# Patient Record
Sex: MALE | Race: OTHER | Employment: FULL TIME | URBAN - METROPOLITAN AREA
[De-identification: names, ages, dates, MRNs, and addresses within clinical notes are randomized per-mention and may not be internally consistent; named-entity substitution may affect disease eponyms.]

---

## 2017-06-24 ENCOUNTER — OCC HEALTH (OUTPATIENT)
Dept: OCCUPATIONAL MEDICINE | Age: 62
End: 2017-06-24
Attending: PHYSICIAN ASSISTANT

## 2024-01-20 NOTE — PROGRESS NOTES
Subjective:   Moises Nettles is a 68 year old male who presents for a MA (Medicare Advantage) Supervisit (Once per calendar year) and scheduled follow up of multiple significant but stable problems.   Moises Nettles is a 68 year old male who presents for a complete physical exam.  Patient with PMH of HTN, HLD, CAD (s/p several stents in 2010 and then 2017), s/p MI (ECHO 04/20/2013 with EF of 50%), AAA s/p EVAR 2017 (on Aspirin and clopidogrel), Prediabetes, Former 23.5 pack years smoker, who presents to the office today to establish care and CPE.    Prior PCP:  Aruna Torres MD     He follows with Cardiology Dr. Irving Garcia , saw him last about 1 year ago, will be having appointment soon in February 2024.  His BP at home ar usually in the good range , around 140/80  Sometimes has elevated BP of 160/90 and has headache and not feeling well when this happens, however he states it happens rarely.    Did not take his medications today in am and his BP is elevated in the office at 146/80.    He works as  locally, works 4-5 days a week.  He is  and has 2 step children, no his own children.    Exercise: goes to aqua classes on weekend at [employer]  Diet: Regular diet, tries to avoid fried foods, red meats, pork. Avoids carbs.  Eye exam: once a year, reading glasses only  Dentist: 2 times a year goes for cleanings , has implants [facility]  Smoking: started in 1972 (was 15 yo), quit smoking in 2019, smoked 10 -20 cigarettes a day. Has at least 23.5 pack years history.  Alcohol: Occaisional  No other substance use    Vaccinations:  Influenza: Does not want   COVID:  x 3, no boosters  Pneumococcal:  doesn't want to   Shingles: wants to think about it  Tdap/Td: doesn't want to     Screenings:  Colonoscopy:  Colonoscopy by Dr. Zepeda at [facility] in [place] 2013? Was normal per patient. DUE  PSA: elevated at 6.56 in 4/22/23 - Follows with Urology, has appointment soon  AAA: AAA s/p EVAR 2017, follows  with vascular  Low dose CT: DUE, has >23.5 pack years history    History/Other:   Fall Risk Assessment:   He has been screened for Falls and is low risk.      Cognitive Assessment:   He had a completely normal cognitive assessment - see flowsheet entries       Functional Ability/Status:   Moises Nettles has some abnormal functions as listed below:  He has difficulties Affording Meds based on screening of functional status.       Depression Screening (PHQ-2/PHQ-9): PHQ-2 SCORE: 0  , done 1/23/2024   If you checked off any problems, how difficult have these problems made it for you to do your work, take care of things at home, or get along with other people?: Not difficult at all    Last [place] Suicide Screening on 1/20/2024 was No Risk.     <5 minutes spent screening and counseling for depression    Advanced Directives:   He does NOT have a Living Will. [Do you have a living will?: No]  He does NOT have a Power of  for Health Care. [Do you have a healthcare power of ?: No]  Not discussed      Patient Active Problem List   Diagnosis    Patellofemoral disorder    Coronary atherosclerosis due to calcified coronary lesion    Primary hypertension    Mixed hyperlipidemia     Allergies:  He has No Known Allergies.    Current Medications:  Outpatient Medications Marked as Taking for the 1/20/24 encounter (Office Visit) with Lois Cali MD   Medication Sig    atorvastatin 20 MG Oral Tab Take 1 tablet (20 mg total) by mouth daily.    chlorthalidone 25 MG Oral Tab Take 1 tablet (25 mg total) by mouth 2 (two) times daily.    lisinopril 20 MG Oral Tab Take 1 tablet (20 mg total) by mouth daily.    [DISCONTINUED] potassium chloride 20 MEQ Oral Tab CR Take 1 tablet (20 mEq total) by mouth as needed.    OMEGA-3 FATTY ACIDS OR Take by mouth As Directed.    [DISCONTINUED] Magnesium 200 MG Oral Tab Take 1 tablet (200 mg total) by mouth twice a week.    cloNIDine 0.3 MG/24HR Transdermal Patch Weekly Place 1 patch onto  the skin once a week.    Carvedilol 6.25 MG Oral Tab Take 1 tablet (6.25 mg total) by mouth 2 (two) times daily with meals.    Clopidogrel Bisulfate 75 MG Oral Tab Take 1 tablet (75 mg total) by mouth daily.    Aspirin 81 MG Oral Tab Take 1 tablet (81 mg total) by mouth daily.    Multiple Vitamins-Minerals (CENTRUM OR) Take  by mouth.    CloNIDine HCl 0.3 MG/24HR Transdermal Patch Weekly Place 1 patch onto the skin once a week.    FISH OIL 1200 MG OR CAPS 1 tablet daily       Medical History:  He  has a past medical history of Essential hypertension.  Surgical History:  He  has a past surgical history that includes coronary stent placement (Right, 2010); angioplasty (coronary) (N/A, 2017); and endovascular repair, infrarenal abdominal aortic aneury (2017).   Family History:  His family history includes Cancer in his father; Hypertension in his mother.  Social History:  He  reports that he has quit smoking. He has never been exposed to tobacco smoke. He does not have any smokeless tobacco history on file. He reports that he does not drink alcohol and does not use drugs.    Tobacco:  He smoked tobacco in the past but quit greater than 12 months ago.  Social History    Tobacco Use      Smoking status: Former        Passive exposure: Never      Smokeless tobacco: Not on file       CAGE Alcohol Screen:   CAGE screening score of 0 on 1/23/2024, showing low risk of alcohol abuse.      Patient Care Team:  Lois Cali MD as PCP - General (Internal Medicine)    REVIEW OF SYSTEMS:  GENERAL: feels well otherwise  SKIN: denies any unusual skin lesions  EYES:denies blurred vision or double vision  HEENT: denies nasal congestion, sinus pain or ST  LUNGS: denies shortness of breath with exertion  CARDIOVASCULAR: denies chest pain on exertion  GI: denies abdominal pain,positive for intermittent heartburn  : denies dysuria, frequent urination , wakes at night  MUSCULOSKELETAL: denies back pain  NEURO: denies headaches  PSYCHE:  denies depression or anxiety  HEMATOLOGIC: denies hx of anemia  ENDOCRINE: denies thyroid history  ALL/ASTHMA: denies hx of allergy or asthma    Objective:     PHYSICAL EXAM:   /80 (BP Location: Left arm, Patient Position: Sitting, Cuff Size: adult)   Pulse 68   Temp 97.8 °F (36.6 °C)   Resp 20   Ht 6' (1.829 m)   Wt 222 lb (100.7 kg)   SpO2 99%   BMI 30.11 kg/m²  Estimated body mass index is 30.11 kg/m² as calculated from the following:    Height as of this encounter: 6' (1.829 m).    Weight as of this encounter: 222 lb (100.7 kg).    GENERAL: well developed, well nourished,in no apparent distress  SKIN: no rashes,no suspicious lesions  HEENT: atraumatic, normocephalic,ears and throat are clear, dry nasal mucosa  EYES:PERRLA, conjunctiva are clear  NECK: supple,no adenopathy  LUNGS: clear to auscultation  CARDIO: nl s1 and s2, RRR without murmur  GI: good BS's,no masses, HSM or tenderness  MUSCULOSKELETAL: back is not tender,FROM of the back  EXTREMITIES: no cyanosis, clubbing or edema  NEURO: Oriented times three,cranial nerves are intact,motor and sensory are grossly intact    Medicare Hearing Assessment: Patient wears hearing aids    Assessment & Plan:   Moises Nettles is a 68 year old male who presents for a Medicare Assessment.     1. Medicare Annual wellness visit, subsequent  Pt' s weight is Body mass index is 30.11 kg/m². Recommended regular exercise. Diet discussed.  Age appropriate screenings discussed and orders placed.  The patient indicates understanding of these issues and agrees to the plan.  - Comp Metabolic Panel (14); Future  - Hemoglobin A1C; Future    2. Primary hypertension  Elevated in the office today at 146/80. Patient did not take his morning medications. Compliant with medications, reports no side effects and his BP at home around 140/80.     3. Mixed hyperlipidemia  Patient has h/o HLD, currently on atorvastatin 20 mg daily and fish oil 1200 mg. Recent Lipid panel Showed LDL at  73. His goal ideally is <70    4. Colon cancer screening  Patient overdue for Colon cancer screening colonoscopy. Will refer to GI    5. Coronary atherosclerosis due to calcified coronary lesion  Patient follows with Cardiology and has an appointment with him in February    6. Screening for thyroid disorder  - TSH W Reflex To Free T4; Future    7. Screening for colon cancer  - Gastro Referral - In Network    8. Hypomagnesemia  Has h/o of low Mag in the past. Takes Magnesium 200 mg 2 times a week.  - Magnesium [E]; Future    10. Screening for lung cancer  11. Former smoker  Started smoking in 1972 (was 17 yo), quit smoking in 2019, smoked 10 -20 cigarettes a day. Has at least 23.5 pack years history.  - CT LUNG LD SCREENING(CPT=71271); Future         The patient indicates understanding of these issues and agrees to the plan.  Reinforced healthy diet, lifestyle, and exercise.      Return in about 6 months (around 7/20/2024) for medication checkup.     Lois Cali MD, 1/23/2024     Supplementary Documentation:   General Health:  In the past six months, have you lost more than 10 pounds without trying?: 2 - No  Has your appetite been poor?: No  Type of Diet: Balanced  How does the patient maintain a good energy level?: Appropriate Exercise;Stretching  How would you describe your daily physical activity?: Moderate  How would you describe your current health state?: Good  How do you maintain positive mental well-being?: Social Interaction;Visiting Friends;Visiting Family  On a scale of 0 to 10, with 0 being no pain and 10 being severe pain, what is your pain level?: 0 - (None)  In the past six months, have you experienced urine leakage?: 0-No  At any time do you feel concerned for the safety/well-being of yourself and/or your children, in your home or elsewhere?: No  Have you had any immunizations at another office such as Influenza, Hepatitis B, Tetanus, or Pneumococcal?: No        Moises Nettles's SCREENING SCHEDULE    Tests on this list are recommended by your physician but may not be covered, or covered at this frequency, by your insurer.   Please check with your insurance carrier before scheduling to verify coverage.   PREVENTATIVE SERVICES FREQUENCY &  COVERAGE DETAILS LAST COMPLETION DATE   Diabetes Screening    Fasting Blood Sugar / Glucose    One screening every 12 months if never tested or if previously tested but not diagnosed with pre-diabetes   One screening every 6 months if diagnosed with pre-diabetes Lab Results   Component Value Date    GLUCOSE 118 (H) 10/22/2013     (H) 01/20/2024        Cardiovascular Disease Screening    Lipid Panel  Cholesterol  Lipoprotein (HDL)  Triglycerides Covered every 5 years for all Medicare beneficiaries without apparent signs or symptoms of cardiovascular disease Lab Results   Component Value Date    CHOLEST 163 10/22/2013    HDL 36 (L) 10/22/2013    LDL 89 10/22/2013         Electrocardiogram (EKG)   Covered if needed at Welcome to Medicare, and non-screening if indicated for medical reasons 03/18/2013      Ultrasound Screening for Abdominal Aortic Aneurysm (AAA) Covered once in a lifetime for one of the following risk factors    Men who are 65-75 years old and have ever smoked    Anyone with a family history -     Colorectal Cancer Screening  Covered for ages 50-85; only need ONE of the following:    Colonoscopy   Covered every 10 years    Covered every 2 years if patient is at high risk or previous colonoscopy was abnormal -    Health Maintenance   Topic Date Due    Colorectal Cancer Screening  Never done       Flexible Sigmoidoscopy   Covered every 4 years -    Fecal Occult Blood Test Covered annually -   Prostate Cancer Screening    Prostate-Specific Antigen (PSA) Annually No results found for: \"PSA\"  Health Maintenance   Topic Date Due    PSA  04/22/2025      Immunizations    Influenza Covered once per flu season  Please get every year -  Influenza Vaccine(1) due on  10/01/2023    Pneumococcal Each vaccine ([id number] & [id number]) covered once after 65 Prevnar 13: -    [id number]: -     Pneumococcal Vaccination(1 of 2 - PCV) Never done    Hepatitis B One screening covered for patients with certain risk factors   -  No recommendations at this time    Tetanus Toxoid Not covered by Medicare Part B unless medically necessary (cut with metal); may be covered with your pharmacy prescription benefits -    Tetanus, Diptheria and Pertusis TD and TDaP Not covered by Medicare Part B -  No recommendations at this time    Zoster Not covered by Medicare Part B; may be covered with your pharmacy  prescription benefits -  Zoster Vaccines(1 of 2) Never done     Annual Monitoring of Persistent Medications (ACE/ARB, digoxin diuretics, anticonvulsants)    Potassium Annually Lab Results   Component Value Date    K 3.3 (L) 01/20/2024         Creatinine   Annually Lab Results   Component Value Date    CREATSERUM 0.94 01/20/2024         BUN Annually Lab Results   Component Value Date    BUN 14 01/20/2024       Drug Serum Conc Annually No results found for: \"DIGOXIN\", \"DIG\", \"VALP\"

## 2024-01-22 NOTE — TELEPHONE ENCOUNTER
Spoke with the patient's Wife Maria D over the phone.     1. Elevated hemoglobin A1c  His HBA1C in the diabetic range. Explained that this is not diagnostic of Diabetes yet. Recommend intense life style changes at this time and would repeat Hemoglobin A1 c in 3 months.   - Hemoglobin A1C [E]; Future    2. Low serum potassium level  In regards to low potassium - would increase his potassium supplement from 20 meq daily to 30 meq daily. Running low on supply. Will sent new prescription. Will need to recheck K in 1 months.  - potassium chloride 15 MEQ Oral Tab CR; Take 2 tablets (30 mEq total) by mouth daily.  Dispense: 60 tablet; Refill: 1  - Potassium [E]; Future    3. Hypomagnesemia  Patient takes Magnesium supplementation 2 times a week. Would increase to three times a week. Will repeat Mag in 1 month  - Magnesium [E]; Future

## 2024-01-22 NOTE — TELEPHONE ENCOUNTER
Patient's wife called to follow up on test results, specifically A1C.  She is aware Dr Cali has not reviewed the results yet.  She is available today until 10:30.  Please advise.  Thank you!

## 2024-01-25 NOTE — TELEPHONE ENCOUNTER
From: Moises Nettles  To: Lois Cali  Sent: 1/24/2024 12:52 PM CST  Subject: referral for nutritionist     Hi Dr. SONALI Cali  I have hard time with my diet as so much different info all over  I really want to do it and I called yesterday to my insurance asking if I can get cover to see dietologist or nutritionist and they respond Yes  I call today for an appointment and nutritionist ask for referral. Is it any possibility to get a referral from you  Thank you,  ORESTES Nettles

## 2024-02-03 NOTE — PROGRESS NOTES
ADULT INITIAL OUTPATIENT NUTRITION CONSULTATION    Nutrition Assessment    Medical Diagnosis: Pre-DM    PMH: hyperlipidemia, obesity    Client Hx:  68 year old male    Meds: noted    Labs (1/20/23): A1C: 7.1%    ANTHROPOMETRICS:  Ht: 6'  Wt: 222#    BMI: 30.1  AdjBW: 191#    Current Diet: Regular    Diet hx: pt has been avoiding sweets and alcohol    Food/Beverage Intake:   BREAKFAST: 2 slices whole grain toast, hummus, 1 egg, banana OR turkey simone, bagel, eggs, banana  LUNCH: (brings to work from home) buckwheat, chicken, broccoli OR salad, avocado, chicken, or bologna sausage  DINNER: beet broth soup, buckwheat, OR 2c chili, buckwheat, quinoa, veggies, OR salmon  SNACKS: PB and celery, blackberries, green tea, walnuts, apple, yogurt  BEVERAGES: coffee 1-2 c/dy, water, no juice or soda    Meal pattern: 3 meals/d, 2 snacks/d    Number of meals eaten at restaurants: 1x/month    Alcohol Intake: none, was having ~5 shots/wk    Diet Quality: Improved    Nutrient intake (based on diet record): ~2000 cals/d    Estimated nutrition needs: 4013-5850 cals/d, 110 gm protein, 160gm cho    Physical Activity: drives truck all day, 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   GOAL: 150+ min/wk, cardio    Sleep: 6 hrs/d    Stress/anxiety: high (coping mechanisms encouraged)    Client attended appt with: supportive spouse, Maria D    Nutrition Diagnosis: Food and nutrition related knowledge deficit related to lack of previous diet education by RD as evidence by pt need for education regarding weight loss management and pre-diabetes management.    Nutrition Intervention/Education: Comprehensive nutrition education and evaluation provided for weight loss management and pre-diabetes management. Pt and spouse hoping to avoid meds for DM. Pt reports making many dietary changes over the past few weeks and has already lost 6#. Pt does enjoy cheese and sausage and alcohol, however, has been avoiding all of these. Pt's  spouse prepares all meals, limiting sweets and adding lots of veggies daily. Pt was having fruit for snacks, now adding nuts and PB. Discussed portion control of all foods, reviewed label reading. Encouraged lean protein, low fat dairy, whole grains, omega 3 rich foods, and veggies. Pt aware to increase activity. Written materials were issued and explained, all questions answered today. Pt has set goals to follow recommendations set today, and to contact RD with further questions or concerns.     Monitoring/Evaluation:  Pt to follow with MD for labs. RD available prn.     Time spent with patient and spouse: 75 minutes    Thank you for the referral,    Carlita Baumann RD, LDN  [email or web address]  P: [phone]

## 2024-02-12 NOTE — TELEPHONE ENCOUNTER
Is this medication prescribed by the [facility] Providers? yes    Did the patient contact the pharmacy directly?:  yes - Dr Cali has not filled meds yet.    Is patient out of meds or supply very low?:  2 weeks left    Medication Requested:  potassium chloride 15 MEQ Oral Tab CR   Clopidogrel Bisulfate 75 MG Oral Tab     Is patient requesting a 30 or 90 day supply?:  90    [facility] and phone [facility] or [facility]  [facility] [place] - [address] to [facility] Sites, [phone], [phone]     Is the patient due for an appointment?: no - due in July for med check (if so, please schedule appt)    Additional Notes:  none    Please advise the patient refills take up to 72 business hours.

## 2024-02-12 NOTE — TELEPHONE ENCOUNTER
Pt has apt with new cardiologist March 13th, are you able to prescribe plavix until he sees cardiology?     Last OV relevant to medication: 1/20/24  Last refill date: historical  When pt was asked to return for OV: 7/20/24  Upcoming appt/reason: No future appointments.  Was pt informed of any over due labs: message sent  Lab Results   Component Value Date     (H) 01/20/2024    BUN 14 01/20/2024    CREATSERUM 0.94 01/20/2024    ANIONGAP 6 01/20/2024    GFRNAA > 90 04/13/2013    GFRAA > 90 04/13/2013    CA 8.8 01/20/2024    OSMOCALC 299 (H) 01/20/2024    ALKPHO 44 (L) 01/20/2024    AST 23 01/20/2024    ALT  01/20/2024      Comment:      Due to  backorder we are temporarily unable to offer hospital-based ALT testing at [facility].   If urgently needed, please order ALT test code [id number].   The new order will need a new venipuncture and will be sent to [facility] for testing.   The expected turnaround time will be within 24 hours.     BILT 0.8 01/20/2024    TP 7.3 01/20/2024    ALB 3.8 01/20/2024    GLOBULIN 3.5 01/20/2024    AGRATIO 1.6 10/22/2013     01/20/2024    K 3.3 (L) 01/20/2024     01/20/2024    CO2 33.0 (H) 01/20/2024

## 2024-02-17 NOTE — DISCHARGE INSTRUCTIONS
You may take a warm shower but do not sit in a hot tub or use a heating pad.  Use ice where it hurts, 20 minutes on and 20 minutes off.    Primary care follow-up this coming week.    The baclofen muscle relaxant may make you drowsy.  Do not take it if you are working or driving.

## 2024-02-17 NOTE — ED PROVIDER NOTES
Patient Seen in: [facility]      History     Chief Complaint   Patient presents with    Back Pain     Stated Complaint: back pain    Subjective:   HPI    68-year-old male presents to the emergency department stating that he developed mid back pain nonradiating that began 2 days ago at work.  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  Symptoms are worse with movement.  He denies any cough or difficulty breathing, vomiting or diarrhea.  No abdominal pain.  No analgesic use.    Objective:   No pertinent past medical history.            Past Surgical History:   Procedure Laterality Date    ANGIOPLASTY (CORONARY) N/A 2017    complex PCI    CORONARY STENT PLACEMENT Right 2010    ENDOVASCULAR REPAIR, INFRARENAL ABDOMINAL AORTIC ANEURY  2017                Social History     Socioeconomic History    Marital status:    Tobacco Use    Smoking status: Former     Passive exposure: Never   Vaping Use    Vaping Use: Never used   Substance and Sexual Activity    Alcohol use: Never    Drug use: Never              Review of Systems    Positive for stated complaint: back pain  Other systems are as noted in HPI.  Constitutional and vital signs reviewed.      All other systems reviewed and negative except as noted above.    Physical Exam     ED Triage Vitals [02/17/24 1351]   /66   Pulse 58   Resp 18   Temp 98.4 °F (36.9 °C)   Temp src Temporal   SpO2 97 %   O2 Device None (Room air)       Current:/66   Pulse 58   Temp 98.4 °F (36.9 °C) (Temporal)   Resp 18   Wt 100 kg   SpO2 97%   BMI 29.90 kg/m²         Physical Exam    General appearance: This is an older male sitting on a gurney in no apparent distress.  Vital signs were reviewed per nurses notes.  HEENT: Normocephalic atraumatic.  Anicteric sclera.  Oral mucosa is moist.  Neck nontender without adenopathy or thyromegaly.  Lungs are clear to auscultation.  Heart exam: Normal S1-S2 without extra sounds or murmurs.  Regular  rate and rhythm.  Chest is nontender.  Abdomen is nontender without masses or rebound.  Back: Patient localizes the pain to the mid [place] thoracic region midline.  No crepitations or step-offs.  No discrete tenderness.  Skin: No rashes or lesions.  Extremities are atraumatic.  Neuroexam: Awake and moving all 4 extremities well.    ED Course   Labs Reviewed - No data to display          XR THORACIC SPINE (3 VIEWS) (CPT=72072)    Result Date: 2/17/2024  PROCEDURE:  XR THORACIC SPINE (3 VIEWS) (CPT=72072)  TECHNIQUE:  AP, lateral, and swimmer's views of the thoracic spine were obtained.  COMPARISON:  None.  INDICATIONS:  back pain  PATIENT STATED HISTORY: (As transcribed by Technologist)  Upper back pain. No trauma.    FINDINGS:    BONES:  There is mild kyphosis.  No significant spondylosis, scoliosis, fracture, or visible bony lesion. DISC SPACES:  There is mild disc space narrowing with osteophytes consistent mild degenerative disc disease throughout the mid thoracic spine. PARASPINOUS:  Negative.  No paraspinous abnormality is seen. OTHER:  Stent graft overlies the lower thoracic/upper lumbar spine..             CONCLUSION:  Mild degenerative disc disease within the midthoracic spine without acute bony injury.   LOCATION:  [facility]    Dictated by (CST): Dhaval Mckeon MD on 2/17/2024 at 2:01 PM     Finalized by (CST): Dhaval Mckeon MD on 2/17/2024 at 2:03 PM       I personally reviewed the images myself and went over results with patient.    I viewed the thoracic spine series films myself and there are no acute fractures.  Some DJD.    Test results and treatment plan were discussed with the patient and his wife.  Discharge instructions were rendered.         MDM      #1.  Acute midline thoracic back pain.  Likely musculoskeletal in etiology.  No evidence of compression fracture.  No radicular symptoms to suggest nerve root impingement.  Discharged home with RICE, topical agents and PCP follow-up.                                    Medical Decision Making      Disposition and Plan     Clinical Impression:  1. Acute midline thoracic back pain         Disposition:  Discharge  2/17/2024  2:10 pm    Follow-up:  Lois Cali MD  [address] [id number]  [phone]    Call in 2 days            Medications Prescribed:  Discharge Medication List as of 2/17/2024  2:13 PM        START taking these medications    Details   ibuprofen 600 MG Oral Tab Take 1 tablet (600 mg total) by mouth every 8 (eight) hours as needed for Pain or Fever., Normal, Disp-30 tablet, R-0      baclofen 20 MG Oral Tab Take 1 tablet (20 mg total) by mouth 3 (three) times daily as needed., Normal, Disp-24 tablet, R-0      lidocaine-menthol 4-1 % External Patch Place 1 patch onto the skin daily., Normal, Disp-30 patch, R-0

## 2024-02-21 NOTE — PROGRESS NOTES
OFFICE NOTE     Patient ID: Moises Nettles is a 68 year old male.  Today's Date: 02/21/24  Chief Complaint: Urgent Care F/u (Follow up-back pain nonradiating that began 2 days ago at work.  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  Symptoms are worse with movement- xrays done )    Patient comes in today for the follow-up after urgent care visit for acute back pain in the mid back.  He states that on Tuesday-Wednesday has been moving to a few things around and was feeling okay, however on Thursday while in the truck he started noticing some back discomfort, which gradually got worse on Friday.  On Saturday patient decided to go to the urgent care for evaluation because he heard few cracks in his back, while trying to do stretching exercises at home.  Spine x-ray was performed in the urgent care and did not show any acute abnormality.,  However showed mild degenerative disc disease.  Patient was prescribed muscle relaxer and ibuprofen and lidocaine patch.  He has been taking ibuprofen consistently with improvement of his pain, however baclofen made him feel good and made them very sleepy, so he stopped taking it.      Vitals:    02/21/24 1500   BP: 124/70   Pulse: 76   Resp: 18   Temp: 97.8 °F (36.6 °C)   SpO2: 99%   Weight: 217 lb (98.4 kg)   Height: 6' (1.829 m)     body mass index is 29.43 kg/m².  BP Readings from Last 3 Encounters:   02/21/24 124/70   02/17/24 113/66   01/20/24 146/80     The ASCVD Risk score (Faraz DK, et al., 2019) failed to calculate for the following reasons:    Cannot find a previous HDL lab    Cannot find a previous total cholesterol lab      Medications reviewed:  Current Outpatient Medications   Medication Sig Dispense Refill    ibuprofen 600 MG Oral Tab Take 1 tablet (600 mg total) by mouth every 8 (eight) hours as needed for Pain or Fever. 30 tablet 0    baclofen 20 MG Oral Tab Take 1 tablet (20 mg total) by mouth 3 (three) times daily as needed. 24 tablet 0     lidocaine-menthol 4-1 % External Patch Place 1 patch onto the skin daily. 30 patch 0    Potassium Chloride Monisha ER 15 MEQ Oral Tab CR Take 2 tablets (30 mEq total) by mouth daily. 180 tablet 0    clopidogrel 75 MG Oral Tab Take 1 tablet (75 mg total) by mouth daily. 90 tablet 0    Magnesium 200 MG Oral Tab Take 1 tablet (200 mg total) by mouth 3 (three) times a week at 1600.      atorvastatin 20 MG Oral Tab Take 1 tablet (20 mg total) by mouth daily.      chlorthalidone 25 MG Oral Tab Take 1 tablet (25 mg total) by mouth 2 (two) times daily.      lisinopril 20 MG Oral Tab Take 1 tablet (20 mg total) by mouth daily.      OMEGA-3 FATTY ACIDS OR Take by mouth As Directed.      cloNIDine 0.3 MG/24HR Transdermal Patch Weekly Place 1 patch onto the skin once a week.      Carvedilol 6.25 MG Oral Tab Take 1 tablet (6.25 mg total) by mouth 2 (two) times daily with meals.      Aspirin 81 MG Oral Tab Take 1 tablet (81 mg total) by mouth daily.      Multiple Vitamins-Minerals (CENTRUM OR) Take  by mouth.      CloNIDine HCl 0.3 MG/24HR Transdermal Patch Weekly Place 1 patch onto the skin once a week.      FISH OIL 1200 MG OR CAPS 1 tablet daily           Assessment & Plan    1. Mid back pain (Primary)  Patient's back pain most likely related to muscle spasm.  Back x-ray was without significant findings.  Will order physical therapy for the patient.  -     Physical Therapy Referral - [facility]  -    Patient instructed to continue the therapy prescribed in urgent care.        Follow Up: (around 7/20/2024) for medication checkup.          Objective/ Results:   Physical Exam  Constitutional:       Appearance: Normal appearance. He is normal weight.   Cardiovascular:      Rate and Rhythm: Normal rate and regular rhythm.      Heart sounds: Normal heart sounds. No murmur heard.     No friction rub. No gallop.   Pulmonary:      Effort: Pulmonary effort is normal. No respiratory distress.      Breath sounds: Normal breath  sounds. No stridor. No wheezing, rhonchi or rales.   Chest:      Chest wall: No tenderness.   Musculoskeletal:      Thoracic back: Spasms and tenderness present. No swelling, edema, deformity, signs of trauma, lacerations or bony tenderness. Normal range of motion. No scoliosis.   Neurological:      General: No focal deficit present.      Mental Status: He is alert and oriented to person, place, and time. Mental status is at baseline.      Cranial Nerves: No cranial nerve deficit.      Sensory: No sensory deficit.      Motor: No weakness.      Coordination: Coordination normal.      Gait: Gait normal.   Psychiatric:         Mood and Affect: Mood normal.         Behavior: Behavior normal.         Thought Content: Thought content normal.        Reviewed:    Patient Active Problem List    Diagnosis    Coronary atherosclerosis due to calcified coronary lesion    Primary hypertension    Mixed hyperlipidemia    Patellofemoral disorder      No Known Allergies     Social History     Socioeconomic History    Marital status:    Tobacco Use    Smoking status: Former     Passive exposure: Never   Vaping Use    Vaping Use: Never used   Substance and Sexual Activity    Alcohol use: Never    Drug use: Never      Review of Systems  All other systems negative unless otherwise stated in ROS or HPI above.       Lois Cali MD  Internal Medicine       Call office with any questions or seek emergency care if necessary.   Patient understands and agrees to follow directions and advice.      ----------------------------------------- PATIENT INSTRUCTIONS-----------------------------------------     There are no Patient Instructions on file for this visit.

## 2024-03-25 NOTE — TELEPHONE ENCOUNTER
Is this medication prescribed by the [facility] Providers? No    Did the patient contact the pharmacy directly?:  Yes    Is patient out of meds or supply very low?:  No. 8    Medication Requested:  chlorthalidone     Dose:  25 MG Oral Tab     Is patient requesting a 30 or 90 day supply?:  90    Pharmacy name and phone # or location:  [place] [address] [place] [address]. [phone], [phone]     Is the patient due for an appointment?: No  (if so, please schedule appt)    Additional Notes:  Not prescribed by Dr Cali, but pt insists they spoke about it    Please advise the patient refills take up to 72 business hours    ======================================    Is this medication prescribed by the [facility] Providers? No    Did the patient contact the pharmacy directly?:  Yes    Is patient out of meds or supply very low?:  No, 8    Medication Requested:  Carvedilol     Dose:  6.25 MG Oral Tab     Is patient requesting a 30 or 90 day supply?:  90    Pharmacy name and phone # or location:  [place] [address] [place] [address]. [phone], [phone]     Is the patient due for an appointment?: No  (if so, please schedule appt)    Additional Notes:  Not prescribed by Dr Cali, but pt insists they spoke about it    Please advise the patient refills take up to 72 business hours..

## 2024-03-25 NOTE — TELEPHONE ENCOUNTER
Pt states that these are managed by you and not cardiology. Looks like he saw cards 3/814/24. Please advise, thank you.

## 2024-05-21 NOTE — TELEPHONE ENCOUNTER
Patient requested external medication, not prescribed by our office. Called patient. Spoke to spouse. Previously prescribed by prior pcp. Has 4 days left. Confirmed dose.    Last OV relevant to medication: 1/20, visit since   Last refill date: unknown     #/refills: unknown   When pt was asked to return for OV:   Follow Up: (around 7/20/2024) for medication checkup.     Upcoming appt/reason:   Future Appointments   Date Time Provider Department Center   6/12/2024 10:00 AM Yan Manzano MD [facility] SUB GI       Was pt informed of any over due labs: no active labs    . Primary hypertension  Elevated in the office today at 146/80. Patient did not take his morning medications. Compliant with medications, reports no side effects and his BP at home around 140/80.         Lab Results   Component Value Date     (H) 01/20/2024    BUN 14 01/20/2024    CREATSERUM 0.94 01/20/2024    ANIONGAP 6 01/20/2024    GFRNAA > 90 04/13/2013    GFRAA > 90 04/13/2013    CA 8.8 01/20/2024    OSMOCALC 299 (H) 01/20/2024    ALKPHO 44 (L) 01/20/2024    AST 23 01/20/2024    ALT  01/20/2024      Comment:      Due to  backorder we are temporarily unable to offer hospital-based ALT testing at [facility].   If urgently needed, please order ALT test code [id number].   The new order will need a new venipuncture and will be sent to [facility] for testing.   The expected turnaround time will be within 24 hours.     BILT 0.8 01/20/2024    TP 7.3 01/20/2024    ALB 3.8 01/20/2024    GLOBULIN 3.5 01/20/2024    AGRATIO 1.6 10/22/2013     01/20/2024    K 3.5 02/24/2024     01/20/2024    CO2 33.0 (H) 01/20/2024

## 2024-06-04 NOTE — TELEPHONE ENCOUNTER
Is this medication prescribed by the [facility] Providers? Yes     Did the patient contact the pharmacy directly?:  no NEW PHARM    Is patient out of meds or supply very low?:  2 days left     Medication Requested:  Potassium Chloride Monisha ER 15 MEQ Oral Tab CR     Dose:      Is patient requesting a 30 or 90 day supply?:  90    Pharmacy name and phone # or location:  [facility] [address] [address] [phone], [phone]     Is the patient due for an appointment?: not due until 7/24 will make appointment late (if so, please schedule appt)    Additional Notes:      Please advise the patient refills take up to 72 business hours.

## 2024-06-04 NOTE — TELEPHONE ENCOUNTER
Last OV relevant to medication:1/20/24  Last refill date: 2/13/24 180#/refills: 0  When pt was asked to return for OV: 7/20/24  Upcoming appt/reason:   Future Appointments   Date Time Provider Department Center   6/12/2024 10:00 AM Yan Manzano MD [facility] SUB GI   Was pt informed of any over due labs: utd  Lab Results   Component Value Date    K 3.5 02/24/2024

## 2024-07-06 NOTE — DISCHARGE INSTRUCTIONS
Take medications as directed.  You may continue Coricidin as well.  Close follow up with primary care doctor.  If any chest pain or shortness of breath go to ER.

## 2024-07-06 NOTE — ED PROVIDER NOTES
Patient Seen in: [facility]      History     Chief Complaint   Patient presents with    Cough/URI     Stated Complaint: Cough    Subjective:   HPI  68-year-old with coronary artery disease, hypertension, and hypercholesterolemia presents with wife for complaint of 3 weeks of productive cough with tan sputum.  He denies any chest pain or shortness of breath.  He mowed the lawn today prior to coming.  He denies any leg pain or swelling.  Is any fever or chills.  His wife is concerned because her son is currently on chemotherapy for cancer and she does not want him to get sick.    Objective:   Past Medical History:    CAD (coronary artery disease)    Essential hypertension    Hypercholesteremia              Past Surgical History:   Procedure Laterality Date    Angioplasty (coronary) N/A 2017    complex PCI    Coronary stent placement Right 2010    Endovascular repair, infrarenal abdominal aortic aneury  2017                Social History     Socioeconomic History    Marital status:    Tobacco Use    Smoking status: Former     Passive exposure: Never   Vaping Use    Vaping status: Never Used   Substance and Sexual Activity    Alcohol use: Never    Drug use: Never              Review of Systems   All other systems reviewed and are negative.      Positive for stated Chief Complaint: Cough/URI    Other systems are as noted in HPI.  Constitutional and vital signs reviewed.      All other systems reviewed and negative except as noted above.    Physical Exam     ED Triage Vitals [07/06/24 0908]   /67   Pulse 64   Resp 20   Temp 97.3 °F (36.3 °C)   Temp src Temporal   SpO2 97 %   O2 Device None (Room air)       Current Vitals:   Vital Signs  BP: 127/67  Pulse: 64  Resp: 20  Temp: 97.3 °F (36.3 °C)  Temp src: Temporal    Oxygen Therapy  SpO2: 97 %  O2 Device: None (Room air)            Physical Exam  Vitals and nursing note reviewed.   Constitutional:       General: He is not in acute distress.      Appearance: He is well-developed. He is not ill-appearing or toxic-appearing.   HENT:      Right Ear: Tympanic membrane, ear canal and external ear normal.      Left Ear: Tympanic membrane, ear canal and external ear normal.      Nose: No congestion or rhinorrhea.      Mouth/Throat:      Pharynx: No oropharyngeal exudate or posterior oropharyngeal erythema.   Cardiovascular:      Rate and Rhythm: Normal rate and regular rhythm.      Heart sounds: Normal heart sounds.   Pulmonary:      Effort: Pulmonary effort is normal. No respiratory distress.      Breath sounds: Normal breath sounds. No wheezing.   Musculoskeletal:      Right lower leg: No edema.      Left lower leg: No edema.   Skin:     General: Skin is warm and dry.   Neurological:      Mental Status: He is alert and oriented to person, place, and time.               ED Course   Labs Reviewed - No data to display          XR CHEST PA + LAT CHEST (CPT=71046)    Result Date: 7/6/2024  PROCEDURE:  XR CHEST PA + LAT CHEST (CPT=71046)  INDICATIONS:  Cough  COMPARISON:  [facility] , XR, CHEST AP PORT, 9/24/2010, 6:52 AM.  TECHNIQUE:  PA and lateral chest radiographs were obtained.  PATIENT STATED HISTORY: (As transcribed by Technologist)  Patient here with productive cough for about 3 weeks.    FINDINGS:  LUNGS:  No focal consolidation.  Normal vascularity. CARDIAC:  Normal size cardiac silhouette. MEDIASTINUM:  Normal. PLEURA:  Normal.  No pleural effusions. BONES:  Normal for age.            CONCLUSION:  No acute process   LOCATION:  [facility]   Dictated by (CST): Dennis Garcia MD on 7/06/2024 at 10:05 AM     Finalized by (CST): Dennis Garcia MD on 7/06/2024 at 10:05 AM               [facility]     Medical Decision Making  68-year-old with coronary artery disease, hypertension, and hypercholesterolemia presents with wife for complaint of 3 weeks of productive cough with tan sputum.  He denies any chest pain or shortness of breath.  He mowed the lawn today prior to coming.   He denies any leg pain or swelling.  Is any fever or chills.  His wife is concerned because her son is currently on chemotherapy for cancer and she does not want him to get sick.    Pertinent Imaging studies reviewed. (See chart for details).  Patient coming in with cough.   Differential diagnosis includes but not limited to cough, bronchitis, pneumonia  Radiology chest x-ray with no acute process.  Will treat for subacute cough.  Will discharge on prednisone and Tessalon. Patient is comfortable with this plan.    Overall Pt looks good. Non-toxic, well-hydrated and in no respiratory distress. Vital signs are reassuring. Exam is reassuring.  Patient without tachycardia or hypoxia.  He denies any chest pain or shortness of breath.  He mowed the lawn this morning without any difficulty.  I do not believe pt requires and additional diagnostic studies or intervention. I believe pt can be discharged home to continue evaluation as an outpatient. Follow-up provider given. Discharge instructions given and reviewed. Return for any problems. All understand and agree with the plan.    Please note that this report has been produced using speech recognition software and may contain errors related to that system including, but not limited to, errors in grammar, punctuation, and spelling, as well as words and phrases that possibly may have been recognized inappropriately. If there are any questions or concerns, contact the dictating provider for clarification.    Problems Addressed:  Subacute cough: acute illness or injury    Amount and/or Complexity of Data Reviewed  Independent Historian: spouse  Radiology: ordered and independent interpretation performed.     Details: Chest x-ray ordered and independently interpreted by myself is no consolidation        Disposition and Plan     Clinical Impression:  1. Subacute cough         Disposition:  Discharge  7/6/2024 10:20 am    Follow-up:  No follow-up provider  specified.        Medications Prescribed:  Discharge Medication List as of 7/6/2024 10:25 AM        START taking these medications    Details   predniSONE 20 MG Oral Tab Take 2 tablets (40 mg total) by mouth daily for 5 days., Normal, Disp-10 tablet, R-0      benzonatate 100 MG Oral Cap Take 1 capsule (100 mg total) by mouth 3 (three) times daily as needed., Normal, Disp-30 capsule, R-0

## 2024-07-06 NOTE — ED INITIAL ASSESSMENT (HPI)
3 weeks of productive cough. Using otc cough syrup with no relief. Denies fevers or chest pain/sob.

## 2024-07-15 NOTE — PROGRESS NOTES
OFFICE NOTE     Patient ID: Moises Nettles is a 68 year old male.  Today's Date: 07/15/24  Chief Complaint: Cough (Cough. Symptom started 1 month ago. Patient taking over the counter Benzonatate. )    Patient with PMH of HTN, HLD, CAD (s/p several stents in 2010 and then 2017), s/p MI (ECHO 04/20/2013 with EF of 50%), AAA s/p EVAR 2017 (on Aspirin and clopidogrel), Prediabetes, Former 23.5 pack years smoker, presents today for the evaluation of cough for the past 1 month, which is getting worse.  He was seen in the  on 07/06/2024 for this problem, CXR was unremarkable, no other labs done. Patient was discharged with 5 days of Prednisone 40 mg daily and benzonatate. His wife is here with him as well and she thinks his cough was better while on prednisone. After prednisone discontinued, his symptoms came back and got worse , now productive of green sputum.   He also reports some nasal congestion and postnasal drip.      Vitals:    07/15/24 1329   BP: 160/80   Pulse: 70   Resp: 19   Temp: 97.6 °F (36.4 °C)   SpO2: 96%   Weight: 203 lb (92.1 kg)   Height: 6' 1\" (1.854 m)     body mass index is 26.78 kg/m².  BP Readings from Last 3 Encounters:   07/15/24 160/80   07/06/24 127/67   02/21/24 124/70     The ASCVD Risk score (Faraz DK, et al., 2019) failed to calculate for the following reasons:    Cannot find a previous HDL lab    Cannot find a previous total cholesterol lab      Medications reviewed:  Current Outpatient Medications   Medication Sig Dispense Refill    azithromycin 250 MG Oral Tab Take 2 tablets (500 mg total) by mouth daily for 1 day, THEN 1 tablet (250 mg total) daily for 4 days. 6 tablet 0    predniSONE 20 MG Oral Tab Take 2 tablets (40 mg total) by mouth daily for 3 days, THEN 1 tablet (20 mg total) daily for 3 days, THEN 0.5 tablets (10 mg total) daily for 4 days. 11 tablet 0    guaiFENesin ER (MUCINEX) 600 MG Oral Tablet 12 Hr Take 1 tablet (600 mg total) by mouth 2 (two) times daily.       benzonatate 100 MG Oral Cap Take 1 capsule (100 mg total) by mouth 3 (three) times daily as needed. 30 capsule 0    Potassium Chloride Monisha ER 15 MEQ Oral Tab CR Take 2 tablets (30 mEq total) by mouth daily. 180 tablet 0    lisinopril 20 MG Oral Tab Take 1 tablet (20 mg total) by mouth daily. 90 tablet 0    clopidogrel 75 MG Oral Tab Take 1 tablet (75 mg total) by mouth daily. 90 tablet 0    Magnesium 200 MG Oral Tab Take 1 tablet (200 mg total) by mouth 3 (three) times a week at 1600.      atorvastatin 20 MG Oral Tab Take 1 tablet (20 mg total) by mouth daily.      chlorthalidone 25 MG Oral Tab Take 1 tablet (25 mg total) by mouth 2 (two) times daily.      Carvedilol 6.25 MG Oral Tab Take 1 tablet (6.25 mg total) by mouth 2 (two) times daily with meals.      Aspirin 81 MG Oral Tab Take 1 tablet (81 mg total) by mouth daily.      Multiple Vitamins-Minerals (CENTRUM OR) Take  by mouth.      CloNIDine HCl 0.3 MG/24HR Transdermal Patch Weekly Place 1 patch onto the skin once a week.      FISH OIL 1200 MG OR CAPS 1 tablet daily      baclofen 20 MG Oral Tab Take 1 tablet (20 mg total) by mouth 3 (three) times daily as needed. 24 tablet 0    OMEGA-3 FATTY ACIDS OR Take by mouth As Directed.      cloNIDine 0.3 MG/24HR Transdermal Patch Weekly Place 1 patch onto the skin once a week.           Assessment & Plan    1. Bronchitis (Primary)  2. Sinusitis, unspecified chronicity, unspecified location  3. Subacute cough  Symptoms consistent with subacute bronchitis. Patient will benefit from antibiotic course given worsening symptoms and sputum production. Will prescribe another round of steroid taper over the next 10 days.   Will provide sample of AirSupra to use 1 puff before bedtime.  -     Azithromycin; Take 2 tablets (500 mg total) by mouth daily for 1 day, THEN 1 tablet (250 mg total) daily for 4 days.  Dispense: 6 tablet; Refill: 0  -     predniSONE; Take 2 tablets (40 mg total) by mouth daily for 3 days, THEN 1 tablet  (20 mg total) daily for 3 days, THEN 0.5 tablets (10 mg total) daily for 4 days.  Dispense: 11 tablet; Refill: 0  - If no improvement , might need trial of antihistamines and possibly further evaluation with PFT as he might have underlying COPD due to smoking history.      Follow Up: As needed/if symptoms worsen or Return in about 2 months (around 9/15/2024) for medication check and possible labs..     I spent 30 minutes obtaining pertitent medical history, reviewing pertinent imaging/labs and specialists notes, evaluating patient, discussing differential diagnosis' and various treatment options, reinforcing importance of compliance with treatment plan, and completing documentation.       Objective/ Results:   Physical Exam  Constitutional:       Appearance: Normal appearance. He is normal weight.   HENT:      Head: Normocephalic and atraumatic.      Nose: Congestion present.      Mouth/Throat:      Pharynx: No oropharyngeal exudate or posterior oropharyngeal erythema.   Eyes:      Extraocular Movements: Extraocular movements intact.      Conjunctiva/sclera: Conjunctivae normal.      Pupils: Pupils are equal, round, and reactive to light.   Cardiovascular:      Rate and Rhythm: Normal rate and regular rhythm.      Heart sounds: Normal heart sounds. No murmur heard.     No friction rub. No gallop.   Pulmonary:      Effort: Pulmonary effort is normal. No respiratory distress.      Breath sounds: Normal breath sounds. No stridor. No wheezing, rhonchi or rales.   Musculoskeletal:      Cervical back: Neck supple. No rigidity.      Right lower leg: No edema.      Left lower leg: No edema.   Lymphadenopathy:      Cervical: No cervical adenopathy.   Neurological:      General: No focal deficit present.      Mental Status: He is alert and oriented to person, place, and time. Mental status is at baseline.   Psychiatric:         Mood and Affect: Mood normal.         Behavior: Behavior normal.         Thought Content: Thought  content normal.        Reviewed:    Patient Active Problem List    Diagnosis    Special screening for malignant neoplasm of colon    Benign neoplasm of cecum    Benign neoplasm of ascending colon    Benign neoplasm of transverse colon    Polyp of colon    Coronary atherosclerosis due to calcified coronary lesion    Primary hypertension    Mixed hyperlipidemia    Patellofemoral disorder      No Known Allergies     Social History     Socioeconomic History    Marital status:    Tobacco Use    Smoking status: Former     Passive exposure: Never   Vaping Use    Vaping status: Never Used   Substance and Sexual Activity    Alcohol use: Never    Drug use: Never      Review of Systems   Constitutional:  Negative for activity change, appetite change, chills, fatigue and fever.   HENT:  Positive for congestion and postnasal drip. Negative for ear discharge, ear pain, rhinorrhea, sinus pressure, sneezing, sore throat, trouble swallowing and voice change.    Eyes: Negative.    Respiratory:  Positive for cough. Negative for apnea, choking, chest tightness, shortness of breath, wheezing and stridor.    Cardiovascular:  Negative for chest pain, palpitations and leg swelling.   Gastrointestinal: Negative.    Endocrine: Negative.    Genitourinary: Negative.    Musculoskeletal: Negative.    Skin: Negative.      All other systems negative unless otherwise stated in ROS or HPI above.       Lois Cali MD  Internal Medicine       Call office with any questions or seek emergency care if necessary.   Patient understands and agrees to follow directions and advice.      ----------------------------------------- PATIENT INSTRUCTIONS-----------------------------------------     There are no Patient Instructions on file for this visit.      The 21st Century Cures Act makes medical notes available to patients in the interest of transparency.  However, please be advised that this is a medical document.  It is intended as a peer to peer  communication.  It is written in medical language and may contain abbreviations or verbiage that are technical and unfamiliar.  It may appear blunt or direct.  Medical documents are intended to carry relevant information, facts as evident, and the clinical opinion of the practitioner.

## 2024-08-08 NOTE — TELEPHONE ENCOUNTER
From: Moises Nettels  To: Lois Cali  Sent: 8/7/2024 5:55 PM CDT  Subject: info    Thank you, Doctor

## 2024-09-05 NOTE — TELEPHONE ENCOUNTER
Last OV relevant to medication: 1/20/24  Last refill date: 6/4/24 #180/refills: 0  When pt was asked to return for OV: 7/20/24  Upcoming appt/reason:   Future Appointments   Date Time Provider Department Center   9/14/2024 10:00 AM Lois Cali MD [facility]   Was pt informed of any over due labs: utd  Lab Results   Component Value Date     (H) 08/03/2024     (H) 08/03/2024    BUN 12 08/03/2024    BUN 12 08/03/2024    BUNCREA 12.5 08/03/2024    BUNCREA 12.5 08/03/2024    CREATSERUM 0.96 08/03/2024    CREATSERUM 0.96 08/03/2024    ANIONGAP 5 08/03/2024    ANIONGAP 5 08/03/2024    GFRNAA > 90 04/13/2013    GFRAA > 90 04/13/2013    CA 9.5 08/03/2024    CA 9.5 08/03/2024    OSMOCALC 295 08/03/2024    OSMOCALC 295 08/03/2024    ALKPHO 46 08/03/2024    AST 24 08/03/2024    ALT 28 08/03/2024    BILT 0.9 08/03/2024    TP 6.6 08/03/2024    ALB 4.1 08/03/2024    GLOBULIN 2.5 08/03/2024    AGRATIO 1.6 10/22/2013     08/03/2024     08/03/2024    K 3.8 08/03/2024    K 3.8 08/03/2024     08/03/2024     08/03/2024    CO2 30.0 08/03/2024    CO2 30.0 08/03/2024

## 2024-09-14 NOTE — PROGRESS NOTES
OFFICE NOTE     Patient ID: Moises Nettles is a 68 year old male.  Today's Date: 09/14/24  Chief Complaint: Medication Follow-Up (Med check)    PMH of HTN, HLD, CAD (s/p several stents in 2010 and then 2017 in [facility]), s/p MI (ECHO 04/20/2013 with EF of 50%), AAA s/p EVAR 2017 (on Aspirin and clopidogrel), Prediabetes, Former 23.5 pack years smoker, who present today for BP and Med check        #HTN  Managed by Cardiology  Saw Dr. Irving Garcia in the past.  Has seen Dr. Begum  on 6/19/2024  Clonidine was switched from Patch to 0.1 mg BID, and then increased to 0.2 mg BID, which he is currently taking.  Was told not to measure his BP very often at home as he was checking up to 10 times a day  He knows when BP elevated - has headache.    ECHO 07/23/2024  The study quality is good.    The left ventricle is normal in size with mild concentric left ventricular hypertrophy and normal global left ventricular systolic function. The left ventricle diastolic function is impaired (Grade I) with normal left atrial pressure. The left ventricular ejection fraction is 55-60%. No regional wall motion abnormality is noted.   The right ventricle is normal in size. Right ventricular systolic function is normal.   Dilatation of the aortic root limited to the sinus of valsalva is noted. Aortic root diameter is 4.0 cms.    No significant regurgitation or stenosis is noted.       BP in the office today is  146/90 and recheck was 144/86.    # Elevated HbA1C  Hb A1c  7.1 (1/20/2024) - > 5.4 (04/20/2024) - > 5.6 (09/14/2024)  Patient has cheated on the diet slightly, but plans to get back to watching his diet closely and to avoid sugars.    # Hypokalemia   Takes only 15 mg a day (It was recommended to take 30 mg of potassium daily), Takes this for the past several months. Recent labs on 08/03/2024 showed normal Potassium at 3.8.  Will need to repeat potassium levels    # Hypomagnesemia  Takes 200 mg 3 times a week   Has not been  checked for >6 months, will repeat.    # Right sided flank pain   Had some dull nagging pain in the right flank, which causing him a lot of discomfort during the day and night.  Nothing helps to relieve the pain so far.  Is concerned about his kidney    Vitals:    09/14/24 0958 09/14/24 1042   BP: 146/90 144/86   Pulse: 65    Resp: 17    Temp: 97.1 °F (36.2 °C)    SpO2: 99%    Weight: 206 lb (93.4 kg)    Height: 6' 1\" (1.854 m)      body mass index is 27.18 kg/m².  BP Readings from Last 3 Encounters:   09/14/24 144/86   07/15/24 160/80   07/06/24 127/67     The 10-year ASCVD risk score (Faraz DYSON, et al., 2019) is: 20.2%    Values used to calculate the score:      Age: 68 years      Sex: Male      Is Non- : No      Diabetic: No      Tobacco smoker: No      Systolic Blood Pressure: 144 mmHg      Is BP treated: Yes      HDL Cholesterol: 47 mg/dL      Total Cholesterol: 140 mg/dL      Medications reviewed:  Current Outpatient Medications   Medication Sig Dispense Refill    cloNIDine 0.2 MG Oral Tab Take 1 tablet (0.2 mg total) by mouth 2 (two) times daily.      Meloxicam 15 MG Oral Tab Take 1 tablet (15 mg total) by mouth daily. 14 tablet 0    KLOR-CON M15 15 MEQ Oral Tab CR TAKE 2 TABLETS (30 MEQ TOTAL) BY MOUTH DAILY. 180 tablet 0    lisinopril 20 MG Oral Tab Take 1 tablet (20 mg total) by mouth daily. 90 tablet 0    clopidogrel 75 MG Oral Tab Take 1 tablet (75 mg total) by mouth daily. 90 tablet 0    Magnesium 200 MG Oral Tab Take 1 tablet (200 mg total) by mouth 3 (three) times a week at 1600.      atorvastatin 20 MG Oral Tab Take 1 tablet (20 mg total) by mouth daily.      chlorthalidone 25 MG Oral Tab Take 1 tablet (25 mg total) by mouth 2 (two) times daily.      Carvedilol 6.25 MG Oral Tab Take 1 tablet (6.25 mg total) by mouth 2 (two) times daily with meals.      Aspirin 81 MG Oral Tab Take 1 tablet (81 mg total) by mouth daily.      Multiple Vitamins-Minerals (CENTRUM OR) Take  by  mouth.      CloNIDine HCl 0.3 MG/24HR Transdermal Patch Weekly Place 1 patch onto the skin once a week.      FISH OIL 1200 MG OR CAPS 1 tablet daily      baclofen 20 MG Oral Tab Take 1 tablet (20 mg total) by mouth 3 (three) times daily as needed. 24 tablet 0    OMEGA-3 FATTY ACIDS OR Take by mouth As Directed.      cloNIDine 0.3 MG/24HR Transdermal Patch Weekly Place 1 patch onto the skin once a week.           Assessment & Plan    1. Primary hypertension (Primary)  HTN managed with Cardiology.    2. Mixed hyperlipidemia  Recent LDL slightly above goal. Currently taking atorvastatin 20 mg daily.    3. Dilated aortic root (HCC)  ECHO was done ECHO 07/23/2024, followed by Cardiology    4. Chronic right flank pain  Will need to do US to evaluate for any kidney abnormality/stone   -     US KIDNEY/BLADDER (CPT=76770); Future; Expected date: 09/14/2024  -     Urinalysis, Routine; Future; Expected date: 09/14/2024    5. Chronic right-sided low back pain without sciatica  -     Meloxicam; Take 1 tablet (15 mg total) by mouth daily.  Dispense: 14 tablet; Refill: 0  -     Physical Therapy Referral - [facility]    6. Hypokalemia  -     Potassium; Future; Expected date: 09/14/2024    7. Hypomagnesemia  -     Magnesium; Future; Expected date: 09/14/2024    8. Elevated hemoglobin A1c   5.6% in the office today  - POC Hemoglobin A1C    9. Chronic cough  Cough in am with clear sputum. Might be due to developing COPD, as patient is a former smoker. If worsens, might need PFTs.   Recommend more cardio- pulm exercises.    Follow Up: As needed/if symptoms worsen or Return in about 6 months (around 3/14/2025) for medication check..       Objective/ Results:   Physical Exam  Constitutional:       Appearance: Normal appearance. He is normal weight.   HENT:      Head: Normocephalic and atraumatic.      Mouth/Throat:      Pharynx: No oropharyngeal exudate or posterior oropharyngeal erythema.   Eyes:      Extraocular Movements:  Extraocular movements intact.      Conjunctiva/sclera: Conjunctivae normal.      Pupils: Pupils are equal, round, and reactive to light.   Cardiovascular:      Rate and Rhythm: Normal rate and regular rhythm.      Heart sounds: Normal heart sounds. No murmur heard.     No friction rub. No gallop.   Pulmonary:      Effort: Pulmonary effort is normal. No respiratory distress.      Breath sounds: Normal breath sounds. No stridor. No wheezing, rhonchi or rales.   Musculoskeletal:      Cervical back: Neck supple. No rigidity.      Right lower leg: No edema.      Left lower leg: No edema.   Lymphadenopathy:      Cervical: No cervical adenopathy.   Neurological:      General: No focal deficit present.      Mental Status: He is alert and oriented to person, place, and time. Mental status is at baseline.   Psychiatric:         Mood and Affect: Mood normal.         Behavior: Behavior normal.         Thought Content: Thought content normal.        Reviewed:    Patient Active Problem List    Diagnosis    Dilated aortic root (HCC)    Special screening for malignant neoplasm of colon    Benign neoplasm of cecum    Benign neoplasm of ascending colon    Benign neoplasm of transverse colon    Polyp of colon    Coronary atherosclerosis due to calcified coronary lesion    Primary hypertension    Mixed hyperlipidemia    Patellofemoral disorder      No Known Allergies     Social History     Socioeconomic History    Marital status:    Tobacco Use    Smoking status: Former     Passive exposure: Never   Vaping Use    Vaping status: Never Used   Substance and Sexual Activity    Alcohol use: Never    Drug use: Never      Review of Systems   Constitutional: Negative.    HENT: Negative.     Eyes: Negative.    Respiratory:  Positive for cough (in am with clear sputum).    Cardiovascular: Negative.    Gastrointestinal: Negative.    Endocrine: Negative.    Genitourinary: Negative.    Musculoskeletal: Negative.    Neurological: Negative.       All other systems negative unless otherwise stated in ROS or HPI above.       Lois Cali MD  Internal Medicine       Call office with any questions or seek emergency care if necessary.   Patient understands and agrees to follow directions and advice.      ----------------------------------------- PATIENT INSTRUCTIONS-----------------------------------------     There are no Patient Instructions on file for this visit.      The 21st Century Cures Act makes medical notes available to patients in the interest of transparency.  However, please be advised that this is a medical document.  It is intended as a peer to peer communication.  It is written in medical language and may contain abbreviations or verbiage that are technical and unfamiliar.  It may appear blunt or direct.  Medical documents are intended to carry relevant information, facts as evident, and the clinical opinion of the practitioner.

## 2024-11-12 NOTE — TELEPHONE ENCOUNTER
Last OV relevant to medication: 9/14/24  Last refill date: 9/6/24 #180/refills: 0  When pt was asked to return for OV: 3/14/25  Upcoming appt/reason: No future appointments.  Was pt informed of any over due labs: is potassium lab due now?  Lab Results   Component Value Date     (H) 08/03/2024     (H) 08/03/2024    BUN 12 08/03/2024    BUN 12 08/03/2024    BUNCREA 12.5 08/03/2024    BUNCREA 12.5 08/03/2024    CREATSERUM 0.96 08/03/2024    CREATSERUM 0.96 08/03/2024    ANIONGAP 5 08/03/2024    ANIONGAP 5 08/03/2024    GFRNAA > 90 04/13/2013    GFRAA > 90 04/13/2013    CA 9.5 08/03/2024    CA 9.5 08/03/2024    OSMOCALC 295 08/03/2024    OSMOCALC 295 08/03/2024    ALKPHO 46 08/03/2024    AST 24 08/03/2024    ALT 28 08/03/2024    BILT 0.9 08/03/2024    TP 6.6 08/03/2024    ALB 4.1 08/03/2024    GLOBULIN 2.5 08/03/2024    AGRATIO 1.6 10/22/2013     08/03/2024     08/03/2024    K 3.8 08/03/2024    K 3.8 08/03/2024     08/03/2024     08/03/2024    CO2 30.0 08/03/2024    CO2 30.0 08/03/2024

## 2024-11-13 NOTE — TELEPHONE ENCOUNTER
Refill provided.  Please let patient know that he is overdue for lab (potassium and Magnesium). I would like to see the result to make sure there in no need for dose adjustments.

## 2024-11-30 NOTE — DISCHARGE INSTRUCTIONS
Follow-up with your primary care doctor  Use Afrin nasal spray twice a day for nasal congestion  Take Medrol Dosepak as directed  Continue your home medication  Return if any worsening symptoms or new concern

## 2024-11-30 NOTE — ED PROVIDER NOTES
Patient Seen in: [facility]      History   No chief complaint on file.    Stated Complaint: cough/congestion    Subjective:   HPI      69-year-old male with a history of hypertension, hyperlipidemia presents to the immediate care for complaints of 1 week of persistent cough, congestion and rhinorrhea.  Patient is having difficulty breathing through his nose.  He denies having any chest pain.  Denies any shortness of breath.  He did have initially some sore throat but that is since resolved.  He denies having any fevers or chills.  Patient recently returned from [place].  He recently received a flu vaccination in the wife states that the following day he started to develop his symptoms.      Objective:     Past Medical History:    CAD (coronary artery disease)    Essential hypertension    Hypercholesteremia              Past Surgical History:   Procedure Laterality Date    Angioplasty (coronary) N/A 2017    complex PCI    Coronary stent placement Right 2010    Endovascular repair, infrarenal abdominal aortic aneury  2017                Social History     Socioeconomic History    Marital status:    Tobacco Use    Smoking status: Former     Passive exposure: Never   Vaping Use    Vaping status: Never Used   Substance and Sexual Activity    Alcohol use: Never    Drug use: Never              Review of Systems    Positive for stated complaint: cough/congestion  Other systems are as noted in HPI.  Constitutional and vital signs reviewed.      All other systems reviewed and negative except as noted above.    Physical Exam     ED Triage Vitals [11/30/24 1109]   /77   Pulse 65   Resp 16   Temp 98.1 °F (36.7 °C)   Temp src Oral   SpO2 100 %   O2 Device None (Room air)       Current Vitals:   Vital Signs  BP: 139/77  Pulse: 65  Resp: 16  Temp: 98.1 °F (36.7 °C)  Temp src: Oral    Oxygen Therapy  SpO2: 100 %  O2 Device: None (Room air)        Physical Exam  General: Alert and oriented. No acute  distress.  HEENT: Normocephalic. No evidence of trauma. Extraocular movements are intact.  Patient did have nasal congestion.  Cardiovascular exam: Regular rate and rhythm  Lungs: Clear to auscultation bilaterally.  Abdomen: Soft, nondistended, nontender.  Extremities: No evidence of deformity. No clubbing or cyanosis.  Neuro: No focal deficit is noted.    ED Course     Labs Reviewed   POCT FLU TEST - Normal    Narrative:     This assay is a rapid molecular in vitro test utilizing nucleic acid amplification of influenza A and B viral RNA.   RAPID SARS-COV-2 BY PCR     Pal cxr ordered and negative for consolidation, infiltrate or PTX on my independent review of images.  XR CHEST PA + LAT CHEST (CPT=71046) (Final result)  Result time 11/30/24 11:51:25  Final result by Dhaval Mckeon MD (11/30/24 11:51:25)                Impression:    CONCLUSION:  Findings consistent with mild bronchitis.                 COVID and flu are both negative.  Patient was given Afrin nasal spray here in the immediate care with improvement of his congestion.  Patient will be discharged home to continue Afrin nasal spray.  He will be given a prescription for a Medrol Dosepak.  Recommend follow-up with his primary care doctor.             MDM   Patient was screened and evaluated during this visit.   As a treating physician attending to the patient, I determined, within reasonable clinical confidence and prior to discharge, that an emergency medical condition was not or was no longer present.  There was no indication for further evaluation, treatment or admission on an emergency basis.  Comprehensive verbal and written discharge and follow-up instructions were provided to help prevent relapse or worsening.  Patient was instructed to follow-up with her primary care provider for further evaluation and treatment, but to return immediately to the ER for worsening, concerning, new, changing or persisting symptoms.  I discussed the case with the  patient and they had no questions, complaints, or concerns.  Patient felt comfortable going home.    ^^Please note that this report has been produced using speech recognition software and may contain errors related to that system including, but not limited to, errors in grammar, punctuation, and spelling, as well as words and phrases that possibly may have been recognized inappropriately.  If there are any questions or concerns, contact the dictating provider for clarification        Medical Decision Making      Disposition and Plan     Clinical Impression:  1. Viral bronchitis         Disposition:  Discharge  11/30/2024 12:02 pm    Follow-up:  Lois Cali MD  [address] [id number]  [phone]    Call   As needed, If symptoms worsen          Medications Prescribed:  Current Discharge Medication List        START taking these medications    Details   methylPREDNISolone (MEDROL) 4 MG Oral Tablet Therapy Pack Dosepack: take as directed  Qty: 1 each, Refills: 0                 Supplementary Documentation:

## 2024-12-03 NOTE — TELEPHONE ENCOUNTER
Rec fax from provider regarding adherence to lisinopril 20 mg. Called and spoke to spouse. Endorses patients aprn with cards discontinued the lisinopril and started patient on losartan potassium 50 mg tab once a day. Discontinued lisinopril and added external reported losartan. Also takes clonidine-1 tablet (0.2 mg total) by mouth 2 (two) times daily, does not take patch so I removed from med list. Also takes carvediolol - 1 tablet (6.25 mg total) by mouth 2 (two) times daily with meals. Reports takes chlorthalidone 25 mg daily and not BID as it was externally ordered. Reports takes chlorthalidone for bp. Updated med list to reflect chlorthalidone how patient reports taking. Asked if he has any current bp readings- no current readings, cards told him not to check. Spouse endorses he knows when elevated. Recently in  on 11/30 see note in chart, bp recorded 139/77. Reports no other bp meds and cards prescribes all his bp meds. Fyi-thanks! Fax sent for scanning.

## 2025-01-27 NOTE — TELEPHONE ENCOUNTER
Is this medication prescribed by the [facility] Providers? yes    Did the patient contact the pharmacy directly?:  no - pharmacy change    Is patient out of meds or supply very low?:  1 wk    Medication and Dose Requested:  Potassium Chloride Monisha ER (KLOR-CON M15) 15 MEQ Oral Tab CR     Is patient requesting a 30 or 90 day supply?:  90    Pharmacy name and phone # or location:  [id number] - [place], [place] - [address] [phone], [phone] [[id number]]     Is the patient due for an appointment?: Appointment scheduled on 3/15/25  (if so, please schedule appt)    Additional Notes:  none    Please advise the patient refills take up to 72 business hours.

## 2025-01-30 NOTE — TELEPHONE ENCOUNTER
Last OV relevant to medication: 9/14/24 - Med ck  Last refill date: 11/13/24     #/refills: 180/0  When pt was asked to return for OV: 6 months - Med ck  Upcoming appt/reason: 3/15/25 - Med ck  Was pt informed of any over due labs: Pt Informed to Complete    Lab Results   Component Value Date    K 3.8 08/03/2024    K 3.8 08/03/2024     LM's to Complete Lab Work.

## 2025-02-06 NOTE — ED INITIAL ASSESSMENT (HPI)
Pt denies use of  at this time. Pt prefers his wife to translate.     Pt in from home. Pt has had BP changes (going up and down) for a week. Pt started having chest discomfort started yesterday and BP was 199/125. Pt called his cardiologist about his BP earlier this week which they made an appointment for 2/25. Pt's BP was still elevated so he called cardiologist back who sent him here for further eval. Pt has hx of cardiac stents back in 2010. Pt has jaw pain along with the discomfort as well as a headache. Pt denies any vision changes. Pt feels short of breath. Pt denies n/v/d/cough/fever.

## 2025-02-06 NOTE — ED PROVIDER NOTES
Patient Seen in: [facility] Emergency Department      History     Chief Complaint   Patient presents with    Chest Pain Angina     Stated Complaint: chest pain x 1 week    Subjective:     HPI    69-year-old male with hypertension, CAD/PCI, AAA/right iliac aneurysm who has been experiencing fluctuating blood pressure for the past week, with significant spikes occurring in the late afternoon between 3:00 and 5:00 PM. Recorded blood pressure readings have reached as high as 200/114 and 179/116. The patient reports a sensation of being aware of their heartbeat in the left chest, described as uncomfortable but without pain, squeezing, or fracture-like sensations. There is no associated sweating, nausea, vomiting, or difficulty breathing. The patient has not taken any decongestants or cold medications recently. An appointment with a cardiologist is scheduled for February 24, 2025, but an earlier appointment has been arranged for the following day.    Objective:   Past Medical History:    CAD (coronary artery disease)    Essential hypertension    Hypercholesteremia              Past Surgical History:   Procedure Laterality Date    Angioplasty (coronary) N/A 2017    complex PCI    Coronary stent placement Right 2010    Endovascular repair, infrarenal abdominal aortic aneury  2017                Social History     Socioeconomic History    Marital status:    Tobacco Use    Smoking status: Former     Passive exposure: Never   Vaping Use    Vaping status: Never Used   Substance and Sexual Activity    Alcohol use: Never    Drug use: Never              Review of Systems    Positive for stated complaint: chest pain x 1 week  Other systems are as noted in HPI.  Constitutional and vital signs reviewed.      All other systems reviewed and negative except as noted above.    Physical Exam     ED Triage Vitals [02/06/25 1130]   BP (!) 172/74   Pulse 66   Resp 20   Temp 98 °F (36.7 °C)   Temp src Temporal   SpO2 97 %   O2  Device None (Room air)       Current:BP (!) 172/74   Pulse 66   Temp 98 °F (36.7 °C) (Temporal)   Resp 20   Ht 185.4 cm (6' 1\")   Wt 97.1 kg   SpO2 97%   BMI 28.23 kg/m²       General:  Vitals as listed.  No acute distress   HEENT: Sclerae anicteric.  Conjunctivae show no pallor.  Oropharynx clear, mucous membranes moist   Lungs: Diminished.  Air exchange and clear   Heart: regular rate rhythm and no murmur   Extremities: no edema, normal peripheral pulses. No lower extremity symmetry or calf tenderness.   Neuro: Alert oriented and nonfocal  Vitals:    02/06/25 1130   BP: (!) 172/74   Pulse: 66   Resp: 20   Temp: 98 °F (36.7 °C)   TempSrc: Temporal   SpO2: 97%   Weight: 97.1 kg   Height: 185.4 cm (6' 1\")         ED Course     Labs Reviewed   COMP METABOLIC PANEL (14) - Abnormal; Notable for the following components:       Result Value    Glucose 162 (*)     Potassium 3.4 (*)     Calculated Osmolality 298 (*)     All other components within normal limits   TROPONIN I HIGH SENSITIVITY - Normal   CBC WITH DIFFERENTIAL WITH PLATELET   RAINBOW DRAW LAVENDER   RAINBOW DRAW LIGHT GREEN   RAINBOW DRAW BLUE     XR CHEST AP PORTABLE  (CPT=71045)    Result Date: 2/6/2025  PROCEDURE:  XR CHEST AP PORTABLE  (CPT=71045)  TECHNIQUE:  AP chest radiograph was obtained.  COMPARISON:  RUBIO BRANTLEY, XR CHEST PA + LAT CHEST (CPT=71046), 11/30/2024, 11:48 AM.  INDICATIONS:  chest pain x 1 week  PATIENT STATED HISTORY: (As transcribed by Technologist)  patient states he has had chest pains for a few days and shortness of breath    FINDINGS: Cardiac silhouette and pulmonary vasculature are unremarkable. No consolidation, pleural effusion or pneumothorax. IMPRESSION: Unremarkable portable chest radiograph.   LOCATION:  [place] [address]      Dictated by (CST): Gabriele Campos MD on 2/06/2025 at 12:45 PM     Finalized by (CST): Gabriele Campos MD on 2/06/2025 at 12:46 PM      EKG    Rate, intervals and axes as noted on EKG  Report.  Rate: 69  Rhythm: Sinus Rhythm  Reading: No acute ST-T changes and no significant change from EKG done 9/25/2010           ED COURSE and MDM     Sources of the medical history included the patient and his wife    Differential diagnosis before testing included atypical chest pain versus myocardial infarction, a medical condition that poses a threat to life.    I reviewed prior external notes including nuclear medicine heart perfusion test done 6/7/2023 showed fixed defect in the proximal portion of the inferolateral wall with mild hypokinesia and no reversible defect.  His ejection fraction was 50%    To use Tylenol for discomfort as he said he had a headache.  His wife is arrange follow-up with cardiology tomorrow.        I have discussed with the patient the results of testing, differential diagnosis, and treatment plan. They expressed clear understanding of these instructions and agrees to the plan provided.    Disposition and Plan     Clinical Impression:  1. Chest pain, atypical         Disposition:  There is no disposition on file for this visit.  There is no disposition time on file for this visit.    Follow-up:  Lois Cali MD  [address] [address]  [phone]    Schedule an appointment as soon as possible for a visit in 3 day(s)  As needed - and see your cardiologist        Medications Prescribed:  Current Discharge Medication List

## 2025-03-15 NOTE — PROGRESS NOTES
Subjective:   Moises Nettles is a 69 year old male who presents for a MA AHA (Medicare Advantage Annual Health Assessment) and scheduled follow up of multiple significant but stable problems.          The following individual(s) verbally consented to be recorded using ambient AI listening technology and understand that they can each withdraw their consent to this listening technology at any point by asking the clinician to turn off or pause the recording:    Patient name: Moises Nettles  Additional names:  Maria D Nettles  History of Present Illness  Mr. Moises Nettles is a 69 year old male with PMH of HTN, HLD, CAD (s/p several stents in 2010 and then 2017), s/p MI (ECHO 04/20/2013 with EF of 50%), AAA s/p EVAR 2017 (on Aspirin and clopidogrel), Prediabetes, Former 23.5 pack years smoker,     # HTN  # CAD (s/p several stents in 2010 and then 2017)  # s/p MI (ECHO 04/20/2013 with EF of 50%)  Saw cardiology 02/24/2025 - Dr. Hagan  Ordered renin Omid ratio as well as serum metanephrine levels and renal Doppler ultrasound to assess for renal artery stenosis   Echo 7/23/2024 showed normal-sized left ventricle with mild concentric LVH, grade 1 diastolic dysfunction, EF 55-60.  No VMA right ventricular systolic function normal.  Aortic root diameter of 4.0 cm  Myocardial perfusion imaging 2/14/2025:  1.Stress EKG is normal. 1.The study quality is good. 2.This is probably an abnormal perfusion study. Study is consistent with mostly scar tissue with minimal ischemia. 3.Medium minimally reversible perfusion abnormality of moderate intensity in the inferior lateral region. 4.The left ventricular cavity is noted to be normal on the stress study. The left ventricular ejection fraction was calculated to be 44% and left ventricular global function is mildly reduced.    # AAA s/p EVAR 2017 (on Aspirin and clopidogrel)  Ultrasound abdominal aorta 10/15/2024 showed patent endograft without evidence of an endoleak.  The largest diameter  of the aneurysmal sac measured 3.70 x 4.52.  Patent both limbs of the endograft without evidence of stenosis.  Patent bilateral common iliac and external iliac arteries.      # Former 23.5 pack years smoker  CT low-dose lung cancer screening 2/21/2024:  CONCLUSION:    1. Lung-RADS Category  2:  Multiple pulmonary nodules as described above.    2. Lung-RADS Category S:    Negative.    3. Mildly prominent mediastinal lymph nodes which are nonspecific but may be reactive in nature.   4. Hepatic steatosis   5. Colonic diverticulosis without evidence of diverticulitis     He has a persistent cough with sputum production, often producing phlegm in the morning, and experiences epistaxis when blowing his nose. He attributes these symptoms to past nasal issues. He has a history of heavy smoking, which may contribute to his symptoms. No recent respiratory infections are noted. He experiences mild dyspnea, particularly when climbing stairs or walking quickly, and has stopped engaging in physical activities such as going to the gym, spending most of his time resting. His shortness of breath is noticeable during exertion.    He has a history of colon polyps and is due for a repeat colonoscopy this year. He recalls having multiple polyps in the past and is aware of the need for regular screening.    He has an enlarged prostate and has been under the care of a urologist. He underwent a PSA test in November 2024, which showed a level of 8.25. He takes medication to manage nocturia.    He has a history of an aneurysm and is currently being monitored. He plans to transition his care to a new physician locally, as he previously received care at [place].     Exercise: not as active, but wants to start exercising more with weather improvement.  Diet: Regular diet, tries to avoid fried foods, red meats, pork. Avoids carbs.  Eye exam: once a year, reading glasses only  Dentist: 2 times a year goes for cleanings , has implants form  [facility]  Smoking: started in 1972 (was 17 yo), quit smoking in 2019, smoked 10 -20 cigarettes a day. Has at least 23.5 pack years history.  Alcohol: No longer  No other substance use     Vaccinations:  Influenza: Does not want   COVID:  x 3, no boosters  Pneumococcal:  Due, doesn't want to   Shingles: Due, wants to think about it  Tdap/Td: Due, doesn't want to      Screenings:  Colonoscopy:  Done 6/12/2024 13 sessile polyps were resected.  Recommended follow-up in 1 year. Will be due soon. Reminded  PSA: 6.56 (4/22/23) -> 8.25 (11/21/2024) Follows with Urology,needs to schedule ap appointment   AAA: AAA s/p EVAR 2017, follows with vascular  Low dose CT: DUE, has >23.5 pack years history    History/Other:   Fall Risk Assessment:   He has been screened for Falls and is low risk.      Cognitive Assessment:   He had a completely normal cognitive assessment - see flowsheet entries     Functional Ability/Status:   Moises Nettles has some abnormal functions as listed below:  He has Hearing problems based on screening of functional status.      Depression Screening (PHQ-2/PHQ-9): PHQ-2 SCORE: 0  , done 3/15/2025   Last [place] Suicide Screening on 3/15/2025 was No Risk.     <5 minutes spent screening and counseling for depression    Advanced Directives:   He does NOT have a Living Will. [Do you have a living will?: No]  He does NOT have a Power of  for Health Care. [Do you have a healthcare power of ?: No (packet given)]  Discussed Advance Care Planning with patient (and family/surrogate if present). Standard forms made available to patient in After Visit Summary.      Patient Active Problem List   Diagnosis    Patellofemoral disorder    Atherosclerosis of native coronary artery of native heart without angina pectoris    Hypertension    Mixed hyperlipidemia    Dilated aortic root    S/P primary angioplasty with coronary stent    Grade I diastolic dysfunction    Smokers' cough (HCC)    History of endovascular  stent graft for abdominal aortic aneurysm (AAA)    Elevated PSA    Multiple adenomatous polyps    Abdominal aortic aneurysm (AAA) without rupture    Prediabetes    Primary osteoarthritis of right hip    Diverticulosis    Former smoker     Allergies:  He has No Known Allergies.    Current Medications:  Outpatient Medications Marked as Taking for the 3/15/25 encounter (Office Visit) with Lois Cali MD   Medication Sig    hydroCHLOROthiazide 25 MG Oral Tab Take 1 tablet (25 mg total) by mouth daily.    amLODIPine 5 MG Oral Tab Take 1 tablet (5 mg total) by mouth daily.    tamsulosin 0.4 MG Oral Cap     carvedilol 12.5 MG Oral Tab Take 1 tablet (12.5 mg total) by mouth 2 (two) times daily.    cloNIDine 0.3 MG Oral Tab Take 1 tablet (0.3 mg total) by mouth 2 (two) times daily.    losartan 50 MG Oral Tab Take 1 tablet (50 mg total) by mouth daily.    Potassium Chloride Monisha ER (KLOR-CON M15) 15 MEQ Oral Tab CR Take 2 tablets (30 mEq total) by mouth daily.    clopidogrel 75 MG Oral Tab Take 1 tablet (75 mg total) by mouth daily.    Magnesium 200 MG Oral Tab Take 1 tablet (200 mg total) by mouth 3 (three) times a week at 1600.    atorvastatin 20 MG Oral Tab Take 1 tablet (20 mg total) by mouth daily.    Aspirin 81 MG Oral Tab Take 1 tablet (81 mg total) by mouth daily.    Multiple Vitamins-Minerals (CENTRUM OR) Take  by mouth.    FISH OIL 1200 MG OR CAPS 1 tablet daily       Medical History:  He  has a past medical history of CAD (coronary artery disease), Essential hypertension, and Hypercholesteremia.  Surgical History:  He  has a past surgical history that includes coronary stent placement (Right, 2010); angioplasty (coronary) (N/A, 2017); and endovascular repair, infrarenal abdominal aortic aneury (2017).   Family History:  His family history includes Cancer in his father; Hypertension in his mother.  Social History:  He  reports that he quit smoking about 6 years ago. His smoking use included cigarettes. He started  smoking about 53 years ago. He has a 28.2 pack-year smoking history. He has never been exposed to tobacco smoke. He has never used smokeless tobacco. He reports that he does not currently use alcohol. He reports that he does not use drugs.    Tobacco:  He smoked tobacco in the past but quit greater than 12 months ago.  Social History     Tobacco Use   Smoking Status Former    Current packs/day: 0.00    Average packs/day: 0.6 packs/day for 47.0 years (28.2 ttl pk-yrs)    Types: Cigarettes    Start date:     Quit date: 2019    Years since quittin.2    Passive exposure: Never   Smokeless Tobacco Never        CAGE Alcohol Screen:   CAGE screening score of 0 on 3/15/2025, showing low risk of alcohol abuse.      Patient Care Team:  Lois Cali MD as PCP - General (Internal Medicine)    REVIEW OF SYSTEMS:  Review of Systems   Constitutional: Negative.    Eyes: Negative.    Respiratory:  Positive for cough, sputum production and shortness of breath.    Cardiovascular: Negative.  Negative for chest pain, palpitations, orthopnea and leg swelling.   Gastrointestinal: Negative.    Genitourinary: Negative.    Musculoskeletal: Negative.    Skin: Negative.    Neurological: Negative.         Objective:     PHYSICAL EXAM:   /70 (BP Location: Right arm, Patient Position: Sitting, Cuff Size: adult)   Pulse 60   Temp 97.3 °F (36.3 °C) (Temporal)   Resp 16   Ht 6' 1\" (1.854 m)   Wt 223 lb (101.2 kg)   BMI 29.42 kg/m²  Estimated body mass index is 29.42 kg/m² as calculated from the following:    Height as of this encounter: 6' 1\" (1.854 m).    Weight as of this encounter: 223 lb (101.2 kg).  Physical Exam  Vitals reviewed.   Constitutional:       General: He is not in acute distress.     Appearance: Normal appearance. He is not ill-appearing or toxic-appearing.   HENT:      Head: Normocephalic and atraumatic.      Right Ear: Tympanic membrane, ear canal and external ear normal.      Left Ear: Tympanic membrane, ear  canal and external ear normal.      Mouth/Throat:      Mouth: Mucous membranes are moist.      Pharynx: Oropharynx is clear. No oropharyngeal exudate or posterior oropharyngeal erythema.   Eyes:      Extraocular Movements: Extraocular movements intact.      Conjunctiva/sclera: Conjunctivae normal.      Pupils: Pupils are equal, round, and reactive to light.   Cardiovascular:      Rate and Rhythm: Normal rate and regular rhythm.      Pulses: Normal pulses.      Heart sounds: Normal heart sounds. No murmur heard.     No friction rub. No gallop.   Pulmonary:      Effort: Pulmonary effort is normal. No respiratory distress.      Breath sounds: Normal breath sounds. No stridor. No wheezing, rhonchi or rales.   Abdominal:      General: Abdomen is flat. There is no distension.      Palpations: There is no mass.      Tenderness: There is no abdominal tenderness. There is no right CVA tenderness, left CVA tenderness, guarding or rebound.      Hernia: No hernia is present.   Musculoskeletal:      Cervical back: Normal range of motion.      Right lower leg: No edema.      Left lower leg: No edema.   Lymphadenopathy:      Cervical: No cervical adenopathy.   Skin:     General: Skin is warm.      Capillary Refill: Capillary refill takes less than 2 seconds.      Coloration: Skin is not jaundiced or pale.      Findings: No bruising, erythema, lesion or rash.   Neurological:      General: No focal deficit present.      Mental Status: He is alert and oriented to person, place, and time.      Cranial Nerves: No cranial nerve deficit.      Sensory: No sensory deficit.      Motor: No weakness.   Psychiatric:         Mood and Affect: Mood normal.         Behavior: Behavior normal.         Thought Content: Thought content normal.         Judgment: Judgment normal.          Medicare Hearing Assessment:   Hearing Screening    Screening Method: Finger Rub  Finger Rub Result: Pass         Assessment & Plan:   Moises Nettles is a 69 year old  male who presents for a Medicare Assessment.     1. Encounter for subsequent annual wellness visit (AWV) in Medicare patient (Primary)  AHA reviewed  Mood stable  No Falls    2. S/P primary angioplasty with coronary stent  3. Primary hypertension  Hypertension with episodes of hypotension, particularly when taking clonidine and amlodipine. Current blood pressure is 110/70 mmHg, but reports weakness and fatigue. Clonidine increased to 0.3 mg twice daily and amlodipine to 5 mg twice daily by cardiology. Undergoing evaluation for possible renal artery stenosis with renal Doppler ultrasound and blood tests scheduled.    4. Mixed hyperlipidemia  On atorvastatin 20 mg daily. Lipid panel last checked in August 2024 was within normal limits. Plan to recheck lipid panel to ensure continued control of cholesterol levels.  - Order lipid panel to assess current cholesterol levels.  -     Comp Metabolic Panel (14); Future; Expected date: 03/15/2025  -     Hemoglobin A1C; Future; Expected date: 03/15/2025  -     Lipid Panel; Future; Expected date: 03/15/2025  -     TSH W Reflex To Free T4; Future; Expected date: 03/15/2025    5. Dyspnea on exertion  6. Chronic cough  Patient will benefit from Pulmonary function testing.  Chronic cough and sputum production consistent with COPD, with a significant risk factor of heavy smoking. Reports mild dyspnea on exertion, such as when climbing stairs or walking quickly.  - Order pulmonary function tests to assess lung function and confirm COPD diagnosis.  -     CT LUNG LD SCREENING(CPT=71271); Future; Expected date: 03/15/2025  -     Pulmonary Function Test; Future; Expected date: 03/15/2025      7. Hypokalemia  8. Hypomagnesemia  -     Comp Metabolic Panel (14); Future; Expected date: 03/15/2025  -     Magnesium; Future; Expected date: 03/15/2025    9. Elevated hemoglobin A1c  -     Comp Metabolic Panel (14); Future; Expected date: 03/15/2025  -     Hemoglobin A1C; Future; Expected date:  03/15/2025    10. Former smoker  -     CT LUNG LD SCREENING(CPT=71271); Future; Expected date: 03/15/2025    11. Abdominal aortic aneurysm (AAA) without rupture, unspecified part  12. Dilated aortic root  13. Grade I diastolic dysfunction  Ejection fraction of 44% during stress test 02/14/25 indicates systolic dysfunction ?.   - Continue atorvastatin 20 mg daily.  - Continue aspirin 81 mg daily.  - Continue clopidogrel 75 mg daily.  - F/u with cardiology    14. Smokers' cough (HCC)    15. History of endovascular stent graft for abdominal aortic aneurysm (AAA)  F/u vascular. Will need new referral to local physician    16. Elevated PSA  Enlarged prostate with elevated PSA levels (8.25 ng/mL in November 2024). Monitored by a urologist and on medication to manage urinary symptoms.  - Continue follow-up with urologist for prostate monitoring.    17. Multiple adenomatous polyps  18. Diverticulosis  Will need repeat colonoscopy soon. Reminded.         The patient indicates understanding of these issues and agrees to the plan.  Reinforced healthy diet, lifestyle, and exercise.      Return in about 6 months (around 9/15/2025) for Medication check.     Lois Cali MD, 3/15/2025     Supplementary Documentation:   General Health:  In the past six months, have you lost more than 10 pounds without trying?: 2 - No  Has your appetite been poor?: No  Type of Diet: Balanced  How does the patient maintain a good energy level?: Other  How would you describe your daily physical activity?: Light  How would you describe your current health state?: Good  How do you maintain positive mental well-being?: Visiting Family  On a scale of 0 to 10, with 0 being no pain and 10 being severe pain, what is your pain level?: 0 - (None)  In the past six months, have you experienced urine leakage?: 0-No  At any time do you feel concerned for the safety/well-being of yourself and/or your children, in your home or elsewhere?: No  Have you had any  immunizations at another office such as Influenza, Hepatitis B, Tetanus, or Pneumococcal?: Skyla Nettles's SCREENING SCHEDULE   Tests on this list are recommended by your physician but may not be covered, or covered at this frequency, by your insurer.   Please check with your insurance carrier before scheduling to verify coverage.   PREVENTATIVE SERVICES FREQUENCY &  COVERAGE DETAILS LAST COMPLETION DATE   Diabetes Screening    Fasting Blood Sugar / Glucose    One screening every 12 months if never tested or if previously tested but not diagnosed with pre-diabetes   One screening every 6 months if diagnosed with pre-diabetes Lab Results   Component Value Date    GLUCOSE 118 (H) 10/22/2013     (H) 02/06/2025        Cardiovascular Disease Screening    Lipid Panel  Cholesterol  Lipoprotein (HDL)  Triglycerides Covered every 5 years for all Medicare beneficiaries without apparent signs or symptoms of cardiovascular disease Lab Results   Component Value Date    CHOLEST 140 08/03/2024    HDL 47 08/03/2024    LDL 78 08/03/2024    TRIG 74 08/03/2024         Electrocardiogram (EKG)   Covered if needed at Welcome to Medicare, and non-screening if indicated for medical reasons 02/07/2025      Ultrasound Screening for Abdominal Aortic Aneurysm (AAA) Covered once in a lifetime for one of the following risk factors    Men who are 65-75 years old and have ever smoked    Anyone with a family history -     Colorectal Cancer Screening  Covered for ages 50-85; only need ONE of the following:    Colonoscopy   Covered every 10 years    Covered every 2 years if patient is at high risk or previous colonoscopy was abnormal 06/12/2024    Health Maintenance   Topic Date Due    Colorectal Cancer Screening  06/12/2025       Flexible Sigmoidoscopy   Covered every 4 years -    Fecal Occult Blood Test Covered annually -   Prostate Cancer Screening    Prostate-Specific Antigen (PSA) Annually No results found for: \"PSA\"  Health  Maintenance   Topic Date Due    PSA  04/22/2025      Immunizations    Influenza Covered once per flu season  Please get every year 11/17/2024  No recommendations at this time    Pneumococcal Each vaccine ([id number] & [id number]) covered once after 65 Prevnar 13: -    [id number]: -     Pneumococcal Vaccination(1 of 2 - PCV) Never done    Hepatitis B One screening covered for patients with certain risk factors   -  No recommendations at this time    Tetanus Toxoid Not covered by Medicare Part B unless medically necessary (cut with metal); may be covered with your pharmacy prescription benefits -    Tetanus, Diptheria and Pertusis TD and TDaP Not covered by Medicare Part B -  No recommendations at this time    Zoster Not covered by Medicare Part B; may be covered with your pharmacy  prescription benefits -  Zoster Vaccines(1 of 2) Never done     Annual Monitoring of Persistent Medications (ACE/ARB, digoxin diuretics, anticonvulsants)    Potassium Annually Lab Results   Component Value Date    K 3.4 (L) 02/06/2025         Creatinine   Annually Lab Results   Component Value Date    CREATSERUM 1.00 02/06/2025         BUN Annually Lab Results   Component Value Date    BUN 15 02/06/2025       Drug Serum Conc Annually No results found for: \"DIGOXIN\", \"DIG\", \"VALP\"           Chronic Obstructive Pulmonary Disease (COPD)    Spirometry Annually Spirometry date:

## 2025-04-28 NOTE — TELEPHONE ENCOUNTER
Last OV relevant to medication: 3/15/25  Last refill date: 1/30/25 #180/refills: 0  When pt was asked to return for OV: 9/15/25  Upcoming appt/reason:   Future Appointments   Date Time Provider Department Center   7/16/2025  9:00 AM Yan Manzano MD [facility]   Was pt informed of any over due labs: utd  Lab Results   Component Value Date     (H) 03/25/2025    BUN 12 03/25/2025    BUNCREA 12.5 08/03/2024    BUNCREA 12.5 08/03/2024    CREATSERUM 0.95 03/25/2025    ANIONGAP 6 03/25/2025    GFRNAA > 90 04/13/2013    GFRAA > 90 04/13/2013    CA 9.5 03/25/2025    OSMOCALC 296 (H) 03/25/2025    ALKPHO 45 03/25/2025    AST 22 03/25/2025    ALT 30 03/25/2025    BILT 0.8 03/25/2025    TP 6.6 03/25/2025    ALB 4.2 03/25/2025    GLOBULIN 2.4 03/25/2025    AGRATIO 1.6 10/22/2013     03/25/2025    K 3.8 03/25/2025     03/25/2025    CO2 32.0 03/25/2025

## 2025-07-03 NOTE — PROCEDURES
Pulmonary Function Test Report  Findings:  Prebronchodilator FEV1 is 2.77L, z-score -1.10.  Prebronchodilator FVC is 4.03L, z-score -0.70.                           Postbronchodilator FEV1 is 2.82L, z-score -1.02.  Postbronchodilator FVC is 3.95L, z-score -0.82.   FEV1/ FVC ratio is 69 %, z-score -0.95.   There is no significant bronchodilator response after administration of albuterol.    The flow-volume loop demonstrates a normal pattern.  The TLC is 7.31L, z-score -0.56. The residual volume 3.10L, z-score 0.37.   The diffusion capacity is 23.74, z-score -0.92 and -0.50 when corrected for alveolar volume.     Impression:  Spirometry is normal.  Lung volumes are normal.   Diffusion capacity is normal.  Disclaimer: This PFT has been interpreted based on Z-score/LLN/ULN criteria as described in ATS guidelines 2022.   Bob León MD  [facility]  Office: [phone]

## 2025-07-27 NOTE — ED PROVIDER NOTES
Patient Seen in: [facility]        History  Chief Complaint   Patient presents with    Leg Swelling    Leg Pain     Stated Complaint: Lt leg swollen    Subjective:   This is a 69-year-old male with a history of hypertension, high cholesterol, coronary artery disease.  Presents to immediate care for left lower leg pain and swelling.  Patient works as a .  He does take Plavix and aspirin daily.  Denies any trauma or injury to the left lower leg.  States he has been having some intermittent swelling in the left lower leg that is worse at nighttime.  Swelling is relieved with resting and elevation.  There are no skin changes.  He denies any chest pain or shortness of breath.  No fevers.  No history of clots.  No treatment attempted prior to arrival.     The history is provided by the patient.                     Objective:     Past Medical History:    CAD (coronary artery disease)    Essential hypertension    Hypercholesteremia              Past Surgical History:   Procedure Laterality Date    Angioplasty (coronary) N/A 2017    complex PCI    Colonoscopy      Coronary stent placement Right 2010    Endovascular repair, infrarenal abdominal aortic aneury  2017                Social History     Socioeconomic History    Marital status:    Tobacco Use    Smoking status: Former     Current packs/day: 0.00     Average packs/day: 0.6 packs/day for 47.0 years (28.2 ttl pk-yrs)     Types: Cigarettes     Start date:      Quit date: 2019     Years since quittin.5     Passive exposure: Never    Smokeless tobacco: Never   Vaping Use    Vaping status: Never Used   Substance and Sexual Activity    Alcohol use: Not Currently    Drug use: Never     Social Drivers of Health     Food Insecurity: No Food Insecurity (3/15/2025)    NCSS - Food Insecurity     Worried About Running Out of Food in the Last Year: No     Ran Out of Food in the Last Year: No   Transportation Needs: No Transportation  Needs (3/15/2025)    NCSS - Transportation     Lack of Transportation: No   Housing Stability: Not At Risk (3/15/2025)    NCSS - Housing/Utilities     Has Housing: Yes     Worried About Losing Housing: No     Unable to Get Utilities: No              Review of Systems   Constitutional:  Negative for chills and fever.   HENT:  Negative for congestion and sore throat.    Respiratory:  Negative for shortness of breath.    Cardiovascular:  Positive for leg swelling. Negative for chest pain and palpitations.   Gastrointestinal:  Negative for abdominal pain.   Genitourinary:  Negative for dysuria.   Musculoskeletal:  Positive for myalgias. Negative for back pain, neck pain and neck stiffness.   Skin:  Negative for color change, rash and wound.   Neurological:  Negative for dizziness, numbness and headaches.       Positive for stated complaint: Lt leg swollen  Other systems are as noted in HPI.  Constitutional and vital signs reviewed.      All other systems reviewed and negative except as noted above.                  Physical Exam    ED Triage Vitals [07/27/25 0843]   /70   Pulse 54   Resp 18   Temp 98.4 °F (36.9 °C)   Temp src Oral   SpO2 100 %   O2 Device None (Room air)       Current Vitals:   Vital Signs  BP: 112/70  Pulse: 54  Resp: 18  Temp: 98.4 °F (36.9 °C)  Temp src: Oral    Oxygen Therapy  SpO2: 100 %  O2 Device: None (Room air)            Physical Exam  Vitals and nursing note reviewed.   Constitutional:       General: He is not in acute distress.     Appearance: Normal appearance. He is not ill-appearing, toxic-appearing or diaphoretic.   HENT:      Head: Normocephalic and atraumatic.      Right Ear: External ear normal.      Left Ear: External ear normal.      Nose: Nose normal.      Mouth/Throat:      Mouth: Mucous membranes are moist.      Pharynx: Oropharynx is clear.   Eyes:      General:         Right eye: No discharge.         Left eye: No discharge.      Extraocular Movements: Extraocular  movements intact.      Conjunctiva/sclera: Conjunctivae normal.   Cardiovascular:      Rate and Rhythm: Normal rate.   Pulmonary:      Effort: Pulmonary effort is normal.   Musculoskeletal:      Cervical back: Neck supple.      Right lower leg: No edema.      Left lower leg: No edema.   Skin:     General: Skin is warm and dry.      Capillary Refill: Capillary refill takes less than 2 seconds.      Findings: No rash.   Neurological:      Mental Status: He is alert and oriented to person, place, and time.   Psychiatric:         Mood and Affect: Mood normal.         Behavior: Behavior normal.                 ED Course  Labs Reviewed - No data to display       Ultrasound left lower leg                  MDM       Vital signs stable.  Patient is well-appearing and nontoxic looking.  Presents to immediate care for intermittent left lower leg swelling.    Differential diagnosis includes but is not limited to peripheral edema, leg cramps, peripheral vascular disease, calf strain, cellulitis, DVT    There is no evidence of skin changes or warmth.  No concern for infectious process.  No swelling noted on exam.  Distal CMS intact.    Ultrasound films interpreted and reviewed by myself.  Results show no evidence of DVT.    Clinical impression is calf strain vs PVD    DC home.  We discussed resting and icing.  Close follow-up with PCP.  ER precautions reviewed.  Patient and his wife verbalized understanding, and agreed with plan of care.  All questions answered.         Medical Decision Making      Disposition and Plan     Clinical Impression:  1. Left leg swelling         Disposition:  Discharge  7/27/2025  9:42 am    Follow-up:  Lois Cali MD  [address] [address]  [phone]      As needed          Medications Prescribed:  Discharge Medication List as of 7/27/2025  9:49 AM                Supplementary Documentation:

## (undated) NOTE — LETTER
1/30/2025      Moises Nettles  [address] [place] [address]    10/10/1955      Dear Moises Nettles,      After careful review of your medical record it has come to our attention that you are Overdue for Lab Work that Dr. Cali ordered back at your 9/14/24 appointment.    To Avoid Interruptions with Your Future Refill Requests the Lab Work needs to be completed.    We take each of our patient's health very seriously and the key to maintaining your health is to routinely follow-up as planned with your providers.    Any Questions or Concerns please call the office directly at [phone].         Thank you,      [facility]   [address] [facility] [address] [place] [id number]  Phone: [phone]  Fax: [phone]

## (undated) NOTE — LETTER
Date & Time: 2/17/2024, 2:10 PM  Patient: Moises Nettles  Encounter Provider(s):    Lucretia Lees MD       To Whom It May Concern:    Moises Nettles was seen and treated in our department on 2/17/2024. He can return to work with these limitations: No lifting more than 10 pounds.  No twisting or bending. .    If you have any questions or concerns, please do not hesitate to call.        _____________________________  Physician/APC Signature